# Patient Record
Sex: FEMALE | Race: WHITE | NOT HISPANIC OR LATINO | Employment: FULL TIME | URBAN - METROPOLITAN AREA
[De-identification: names, ages, dates, MRNs, and addresses within clinical notes are randomized per-mention and may not be internally consistent; named-entity substitution may affect disease eponyms.]

---

## 2017-01-13 DIAGNOSIS — Z82.79 FAMILY HISTORY OF CONGENITAL OR GENETIC CONDITION: Primary | ICD-10-CM

## 2017-01-23 ENCOUNTER — TELEPHONE (OUTPATIENT)
Dept: CONSULT | Facility: CLINIC | Age: 47
End: 2017-01-23

## 2017-01-23 NOTE — TELEPHONE ENCOUNTER
I attempted to reach Jesse's mother Pat regarding her missed lab appointment from last Friday.  She was scheduled to have blood drawn for the aCGH finding identified in her son Jesse, but was a no show for this.  I attempted to reach Pat to reschedule this appointment as well as to discuss Jesse's next appointment scheduled for Monday 1/30 which I believe will be more helpful for the family if pushed back until we have Pat's results and have a chance to discuss the plan for additional testing.  Pat was unavailable and a voicemail was left for her to return my call at her convenience.      Rachelle Renteria MS [facility] [place]  Genetic Counselor  Division of Genetics and Metabolism

## 2017-01-26 DIAGNOSIS — Z82.79 FAMILY HISTORY OF CONGENITAL OR GENETIC CONDITION: ICD-10-CM

## 2017-01-26 PROCEDURE — 40000891 ZZHCL STATISTIC CGH PARENTAL FOLLOW UP 81228: Performed by: PEDIATRICS

## 2017-01-26 PROCEDURE — 36415 COLL VENOUS BLD VENIPUNCTURE: CPT | Performed by: PEDIATRICS

## 2017-01-26 PROCEDURE — 40000803 ZZHCL STATISTIC DNA ISOL HIGH PURITY: Performed by: PEDIATRICS

## 2017-02-07 ENCOUNTER — OFFICE VISIT (OUTPATIENT)
Dept: FAMILY MEDICINE | Facility: CLINIC | Age: 47
End: 2017-02-07
Payer: COMMERCIAL

## 2017-02-07 VITALS
BODY MASS INDEX: 26.84 KG/M2 | DIASTOLIC BLOOD PRESSURE: 73 MMHG | SYSTOLIC BLOOD PRESSURE: 122 MMHG | TEMPERATURE: 97.5 F | HEART RATE: 86 BPM | OXYGEN SATURATION: 96 % | WEIGHT: 165 LBS

## 2017-02-07 DIAGNOSIS — J20.9 ACUTE BRONCHITIS WITH SYMPTOMS > 10 DAYS: ICD-10-CM

## 2017-02-07 DIAGNOSIS — J01.00 ACUTE NON-RECURRENT MAXILLARY SINUSITIS: Primary | ICD-10-CM

## 2017-02-07 PROCEDURE — 99213 OFFICE O/P EST LOW 20 MIN: CPT | Performed by: NURSE PRACTITIONER

## 2017-02-07 RX ORDER — PREDNISONE 20 MG/1
40 TABLET ORAL DAILY
Qty: 10 TABLET | Refills: 0 | Status: SHIPPED | OUTPATIENT
Start: 2017-02-07 | End: 2017-02-12

## 2017-02-07 RX ORDER — BENZONATATE 100 MG/1
100-200 CAPSULE ORAL 3 TIMES DAILY PRN
Qty: 42 CAPSULE | Refills: 0 | Status: SHIPPED | OUTPATIENT
Start: 2017-02-07 | End: 2017-04-24

## 2017-02-07 NOTE — PROGRESS NOTES
SUBJECTIVE:                                                    Pat Dan is a 46 year old female who presents to clinic today for the following health issues:      RESPIRATORY SYMPTOMS      Duration: Off and on sick since December     Description  nasal congestion, rhinorrhea, sore throat, facial pain/pressure, cough, wheezing, fever, chills, headache, fatigue/malaise, hoarse voice, conjunctival irritation and nausea    Severity: severe    Accompanying signs and symptoms: None    History (predisposing factors):  none    Precipitating or alleviating factors: IBU    Therapies tried and outcome:  OTC NSAID         Problem list and histories reviewed & adjusted, as indicated.  Additional history: as documented    Patient Active Problem List   Diagnosis     situational depression     CARDIOVASCULAR SCREENING; LDL GOAL LESS THAN 160     Shoulder pain     Past Surgical History   Procedure Laterality Date     Surgical history of -   age 16     laparoscopy     Surgical history of -   12/16/05     D & C, supracervical hysterectomy       Social History   Substance Use Topics     Smoking status: Never Smoker      Smokeless tobacco: Never Used     Alcohol Use: No     Family History   Problem Relation Age of Onset     Genitourinary Problems Maternal Grandmother      kidney disease     Genetic Disorder Other      Cystic fibrosis     Breast Cancer Paternal Aunt 55     Breast Cancer Cousin 42         Current Outpatient Prescriptions   Medication Sig Dispense Refill     amoxicillin-clavulanate (AUGMENTIN) 875-125 MG per tablet Take 1 tablet by mouth 2 times daily for 7 days 14 tablet 0     predniSONE (DELTASONE) 20 MG tablet Take 2 tablets (40 mg) by mouth daily for 5 days 10 tablet 0     benzonatate (TESSALON) 100 MG capsule Take 1-2 capsules (100-200 mg) by mouth 3 times daily as needed for cough (Keep out of reach of children) 42 capsule 0     No Known Allergies  BP Readings from Last 3 Encounters:   02/07/17 122/73    07/14/16 106/60   03/03/16 102/57    Wt Readings from Last 3 Encounters:   02/07/17 165 lb (74.844 kg)   07/14/16 160 lb (72.576 kg)   03/04/16 157 lb (71.215 kg)            Labs reviewed in EPIC  Problem list, Medication list, Allergies, and Medical/Social/Surgical histories reviewed in [facility] and updated as appropriate.    ROS:  Constitutional, HEENT, cardiovascular, pulmonary, GI, , musculoskeletal, neuro, skin, endocrine and psych systems are negative, except as otherwise noted.    OBJECTIVE:                                                    /73 mmHg  Pulse 86  Temp(Src) 97.5  F (36.4  C) (Oral)  Wt 165 lb (74.844 kg)  SpO2 96%  Body mass index is 26.84 kg/(m^2).  GENERAL: healthy, alert and no distress  EYES: Eyes grossly normal to inspection, PERRL and conjunctivae and sclerae normal  HENT: ear canals and TM's normal, and mouth without ulcers or lesions POSITIVE for clear serous fluid behind bilateral TMs, maxillary and frontal sinus pressure with palpation/ bending forward, nasal mucosa edematous, erythematous, PND present  RESP: POSITIVE for expiratory wheeze in bilateral lower lobes, improves with cough  CV: regular rate and rhythm, normal S1 S2, no S3 or S4, no murmur, click or rub, no peripheral edema and peripheral pulses strong  ABDOMEN: soft, nontender, no hepatosplenomegaly, no masses and bowel sounds normal  SKIN: no suspicious lesions or rashes  NEURO: Normal strength and tone, mentation intact and speech normal  LYMPH: no cervical, supraclavicular, axillary, or inguinal adenopathy    Diagnostic Test Results:  See orders   Did not receive influenza vaccine. Discussed influenza is going around and this could be a secondary infection associated with that.   ASSESSMENT/PLAN:                                                          ICD-10-CM    1. Acute non-recurrent maxillary sinusitis J01.00 amoxicillin-clavulanate (AUGMENTIN) 875-125 MG per tablet   2. Acute bronchitis with symptoms > 10  days J20.9 predniSONE (DELTASONE) 20 MG tablet     benzonatate (TESSALON) 100 MG capsule       See Patient Instructions    JOHANA Ojeda  [facility]

## 2017-02-07 NOTE — MR AVS SNAPSHOT
After Visit Summary   2/7/2017    Pat Dan    MRN: [id number]           Patient Information     Date Of Birth          1970        Visit Information        Provider Department      2/7/2017 9:20 AM Geeta Meehan NP [facility]        Today's Diagnoses     Acute non-recurrent maxillary sinusitis    -  1     Acute bronchitis with symptoms > 10 days           Care Instructions      Bronchitis with Wheezing (Viral or Bacterial: Adult)    Bronchitis is an infection of the air passages. It often occurs during the common cold and is usually caused by a virus. Symptoms include cough with mucus (phlegm) and low-grade fever. This illness is contagious during the first few days and is spread through the air by coughing and sneezing, or by direct contact (touching the sick person and then touching your own eyes, nose, or mouth).  If there is a lot of inflammation, air flow is restricted. The air passages may also go into spasm, especially if you have asthma. This causes wheezing and difficulty breathing even in people who do not have asthma.  Bronchitis usually lasts 7 to 14 days. The wheezing should improve with treatment during the first week. An inhaler is often prescribed to relax the air passages and stop wheezing. Antibiotics will be prescribed if your doctor thinks there is also a secondary bacterial infection.  Home care    If symptoms are severe, rest at home for the first 2 to 3 days. When you go back to your usual activities, don't let yourself get too tired.    Do not smoke. Also avoid being exposed to secondhand smoke.    You may use over-the-counter medicine to control fever or pain, unless another medicine was prescribed. Note: If you have chronic liver or kidney disease or have ever had a stomach ulcer or gastrointestinal bleeding, talk with your healthcare provider before using these medicines. Also talk to your provider if you are taking medicine to prevent blood  clots.) Aspirin should never be given to anyone younger than 18 years of age who is ill with a viral infection or fever. It may cause severe liver or brain damage.    Your appetite may be poor, so a light diet is fine. Avoid dehydration by drinking 6 to 8 glasses of fluids per day (such as water, soft drinks, sports drinks, juices, tea, or soup). Extra fluids will help loosen secretions in the nose and lungs.    Over-the-counter cough, cold, and sore-throat medicines will not shorten the length of the illness, but they may be helpful to reduce symptoms. (Note: Do not use decongestants if you have high blood pressure.)    If you were given an inhaler, use it exactly as directed. If you need to use it more often than prescribed, your condition may be worsening. If this happens, contact your healthcare provider.    If prescribed, finish all antibiotic medicine, even if you are feeling better after only a few days.  Follow-up care  Follow up with your healthcare provider, or as advised. If you had an X-ray or ECG (electrocardiogram), a specialist will review it. You will be notified of any new findings that may affect your care.  Note: If you are age 65 or older, or if you have a chronic lung disease or condition that affects your immune system, or you smoke, talk to your healthcare provider about having a pneumococcal vaccinations and a yearly influenza vaccination (flu shot).  When to seek medical advice   Call your healthcare provider right away if any of these occur:    Fever of 100.4 F (38 C) or higher    Coughing up increasing amounts of colored sputum    Weakness, drowsiness, headache, facial pain, ear pain, or a stiff neck  Call 911, or get immediate medical care  Contact emergency services right away if any of these occur.    Coughing up blood    Worsening weakness, drowsiness, headache, or stiff neck    Increased wheezing not helped with medication, shortness of breath, or pain with breathing    6006-4122 The  [employer]. [address] [place] [address], [place] [address]. All rights reserved. This information is not intended as a substitute for professional medical care. Always follow your healthcare professional's instructions.        Sinusitis (Antibiotic Treatment)    The sinuses are air-filled spaces within the bones of the face. They connect to the inside of the nose. Sinusitis is an inflammation of the tissue lining the sinus cavity. Sinus inflammation can occur during a cold. It can also be due to allergies to pollens and other particles in the air. Sinusitis can cause symptoms of sinus congestion and fullness. A sinus infection causes fever, headache and facial pain. There is often green or yellow drainage from the nose or into the back of the throat (post-nasal drip). You have been given antibiotics to treat this condition.  Home care:    Take the full course of antibiotics as instructed. Do not stop taking them, even if you feel better.    Drink plenty of water, hot tea, and other liquids. This may help thin mucus. It also may promote sinus drainage.    Heat may help soothe painful areas of the face. Use a towel soaked in hot water. Or,  the shower and direct the hot spray onto your face. Using a vaporizer along with a menthol rub at night may also help.     An expectorant containing guaifenesin may help thin the mucus and promote drainage from the sinuses.    Over-the-counter decongestants may be used unless a similar medicine was prescribed. Nasal sprays work the fastest. Use one that contains phenylephrine or oxymetazoline. First blow the nose gently. Then use the spray. Do not use these medicines more often than directed on the label or symptoms may get worse. You may also use tablets containing pseudoephedrine. Avoid products that combine ingredients, because side effects may be increased. Read labels. You can also ask the pharmacist for help. (NOTE: Persons with high blood pressure should not  use decongestants. They can raise blood pressure.)    Over-the-counter antihistamines may help if allergies contributed to your sinusitis.      Do not use nasal rinses or irrigation during an acute sinus infection, unless told to by your health care provider. Rinsing may spread the infection to other sinuses.    Use acetaminophen or ibuprofen to control pain, unless another pain medicine was prescribed. (If you have chronic liver or kidney disease or ever had a stomach ulcer, talk with your doctor before using these medicines. Aspirin should never be used in anyone under 18 years of age who is ill with a fever. It may cause severe liver damage.)    Don't smoke. This can worsen symptoms.  Follow-up care  Follow up with your healthcare provider or our staff if you are not improving within the next week.  When to seek medical advice  Call your healthcare provider if any of these occur:    Facial pain or headache becoming more severe    Stiff neck    Unusual drowsiness or confusion    Swelling of the forehead or eyelids    Vision problems, including blurred or double vision    Fever of 100.4 F (38 C) or higher, or as directed by your healthcare provider    Seizure    Breathing problems    Symptoms not resolving within 10 days    5322-9251 [employer]. [address] [place] [address]. All rights reserved. This information is not intended as a substitute for professional medical care. Always follow your healthcare professional's instructions.              Follow-ups after your visit        Your next 10 appointments already scheduled     Feb 09, 2017  4:30 PM   LAB with [facility] ([facility])    [address] [place] [id number]   [phone]           Patient must bring picture ID.  Patient should be prepared to give a urine specimen  Please do not eat 10-12 hours before your appointment if you are coming in  fasting for labs on lipids, cholesterol, or glucose (sugar).  Pregnant women should follow their Care Team instructions. Water with medications is okay. Do not drink coffee or other fluids.   If you have concerns about taking  your medications, please ask at office or if scheduling via [employer], send a message by clicking on Secure Messaging, Message Your Care Team.              Who to contact     Normal or non-critical lab and imaging results will be communicated to you by [employer], letter or phone within 4 business days after the clinic has received the results. If you do not hear from us within 7 days, please contact the clinic through [employer] or phone. If you have a critical or abnormal lab result, we will notify you by phone as soon as possible.  Submit refill requests through [employer] or call your pharmacy and they will forward the refill request to us. Please allow 3 business days for your refill to be completed.          If you need to speak with a  for additional information , please call: [phone]             Additional Information About Your Visit        [employer] Information     [employer] gives you secure access to your electronic health record. If you see a primary care provider, you can also send messages to your care team and make appointments. If you have questions, please call your primary care clinic.  If you do not have a primary care provider, please call [phone] and they will assist you.        Care EveryWhere ID     This is your Care EveryWhere ID. This could be used by other organizations to access your [facility] medical records  [id number]        Your Vitals Were     Pulse Temperature Pulse Oximetry             86 97.5  F (36.4  C) (Oral) 96%          Blood Pressure from Last 3 Encounters:   02/07/17 122/73   07/14/16 106/60   03/03/16 102/57    Weight from Last 3 Encounters:   02/07/17 165 lb (74.844 kg)   07/14/16 160 lb (72.576 kg)   03/04/16 157 lb (71.215 kg)               Today, you had the following     No orders found for display         Today's Medication Changes          These changes are accurate as of: 2/7/17  9:42 AM.  If you have any questions, ask your nurse or doctor.               Start taking these medicines.        Dose/Directions    amoxicillin-clavulanate 875-125 MG per tablet   Commonly known as:  AUGMENTIN   Used for:  Acute non-recurrent maxillary sinusitis   Started by:  Geeta Meehan NP        Dose:  1 tablet   Take 1 tablet by mouth 2 times daily for 7 days   Quantity:  14 tablet   Refills:  0       benzonatate 100 MG capsule   Commonly known as:  TESSALON   Used for:  Acute bronchitis with symptoms > 10 days   Started by:  Geeta Meehan NP        Dose:  100-200 mg   Take 1-2 capsules (100-200 mg) by mouth 3 times daily as needed for cough (Keep out of reach of children)   Quantity:  42 capsule   Refills:  0       predniSONE 20 MG tablet   Commonly known as:  DELTASONE   Used for:  Acute bronchitis with symptoms > 10 days   Started by:  Geeta Meehan NP        Dose:  40 mg   Take 2 tablets (40 mg) by mouth daily for 5 days   Quantity:  10 tablet   Refills:  0            Where to get your medicines      These medications were sent to [facility] [place]  [place] - [address] [address] [place] [address]     Phone:  [phone]    - amoxicillin-clavulanate 875-125 MG per tablet  - benzonatate 100 MG capsule  - predniSONE 20 MG tablet             Primary Care Provider Office Phone # Fax #    Raissa Kirby PA-C [phone]       [facility] [address]  [place] [address]        Thank you!     Thank you for choosing [facility]  for your care. Our goal is always to provide you with excellent care. Hearing back from our patients is one way we can continue to improve our services. Please take a few minutes to complete the written survey that you may receive in the mail after  your visit with us. Thank you!             Your Updated Medication List - Protect others around you: Learn how to safely use, store and throw away your medicines at www.disposemymeds.org.          This list is accurate as of: 2/7/17  9:42 AM.  Always use your most recent med list.                   Brand Name Dispense Instructions for use    amoxicillin-clavulanate 875-125 MG per tablet    AUGMENTIN    14 tablet    Take 1 tablet by mouth 2 times daily for 7 days       benzonatate 100 MG capsule    TESSALON    42 capsule    Take 1-2 capsules (100-200 mg) by mouth 3 times daily as needed for cough (Keep out of reach of children)       predniSONE 20 MG tablet    DELTASONE    10 tablet    Take 2 tablets (40 mg) by mouth daily for 5 days

## 2017-02-07 NOTE — PATIENT INSTRUCTIONS
Bronchitis with Wheezing (Viral or Bacterial: Adult)    Bronchitis is an infection of the air passages. It often occurs during the common cold and is usually caused by a virus. Symptoms include cough with mucus (phlegm) and low-grade fever. This illness is contagious during the first few days and is spread through the air by coughing and sneezing, or by direct contact (touching the sick person and then touching your own eyes, nose, or mouth).  If there is a lot of inflammation, air flow is restricted. The air passages may also go into spasm, especially if you have asthma. This causes wheezing and difficulty breathing even in people who do not have asthma.  Bronchitis usually lasts 7 to 14 days. The wheezing should improve with treatment during the first week. An inhaler is often prescribed to relax the air passages and stop wheezing. Antibiotics will be prescribed if your doctor thinks there is also a secondary bacterial infection.  Home care    If symptoms are severe, rest at home for the first 2 to 3 days. When you go back to your usual activities, don't let yourself get too tired.    Do not smoke. Also avoid being exposed to secondhand smoke.    You may use over-the-counter medicine to control fever or pain, unless another medicine was prescribed. Note: If you have chronic liver or kidney disease or have ever had a stomach ulcer or gastrointestinal bleeding, talk with your healthcare provider before using these medicines. Also talk to your provider if you are taking medicine to prevent blood clots.) Aspirin should never be given to anyone younger than 18 years of age who is ill with a viral infection or fever. It may cause severe liver or brain damage.    Your appetite may be poor, so a light diet is fine. Avoid dehydration by drinking 6 to 8 glasses of fluids per day (such as water, soft drinks, sports drinks, juices, tea, or soup). Extra fluids will help loosen secretions in the nose  and lungs.    Over-the-counter cough, cold, and sore-throat medicines will not shorten the length of the illness, but they may be helpful to reduce symptoms. (Note: Do not use decongestants if you have high blood pressure.)    If you were given an inhaler, use it exactly as directed. If you need to use it more often than prescribed, your condition may be worsening. If this happens, contact your healthcare provider.    If prescribed, finish all antibiotic medicine, even if you are feeling better after only a few days.  Follow-up care  Follow up with your healthcare provider, or as advised. If you had an X-ray or ECG (electrocardiogram), a specialist will review it. You will be notified of any new findings that may affect your care.  Note: If you are age 65 or older, or if you have a chronic lung disease or condition that affects your immune system, or you smoke, talk to your healthcare provider about having a pneumococcal vaccinations and a yearly influenza vaccination (flu shot).  When to seek medical advice   Call your healthcare provider right away if any of these occur:    Fever of 100.4 F (38 C) or higher    Coughing up increasing amounts of colored sputum    Weakness, drowsiness, headache, facial pain, ear pain, or a stiff neck  Call 911, or get immediate medical care  Contact emergency services right away if any of these occur.    Coughing up blood    Worsening weakness, drowsiness, headache, or stiff neck    Increased wheezing not helped with medication, shortness of breath, or pain with breathing    3849-8672 [employer]. [address] [place] [address]. All rights reserved. This information is not intended as a substitute for professional medical care. Always follow your healthcare professional's instructions.        Sinusitis (Antibiotic Treatment)    The sinuses are air-filled spaces within the bones of the face. They connect to the inside of the nose. Sinusitis is an inflammation  of the tissue lining the sinus cavity. Sinus inflammation can occur during a cold. It can also be due to allergies to pollens and other particles in the air. Sinusitis can cause symptoms of sinus congestion and fullness. A sinus infection causes fever, headache and facial pain. There is often green or yellow drainage from the nose or into the back of the throat (post-nasal drip). You have been given antibiotics to treat this condition.  Home care:    Take the full course of antibiotics as instructed. Do not stop taking them, even if you feel better.    Drink plenty of water, hot tea, and other liquids. This may help thin mucus. It also may promote sinus drainage.    Heat may help soothe painful areas of the face. Use a towel soaked in hot water. Or,  the shower and direct the hot spray onto your face. Using a vaporizer along with a menthol rub at night may also help.     An expectorant containing guaifenesin may help thin the mucus and promote drainage from the sinuses.    Over-the-counter decongestants may be used unless a similar medicine was prescribed. Nasal sprays work the fastest. Use one that contains phenylephrine or oxymetazoline. First blow the nose gently. Then use the spray. Do not use these medicines more often than directed on the label or symptoms may get worse. You may also use tablets containing pseudoephedrine. Avoid products that combine ingredients, because side effects may be increased. Read labels. You can also ask the pharmacist for help. (NOTE: Persons with high blood pressure should not use decongestants. They can raise blood pressure.)    Over-the-counter antihistamines may help if allergies contributed to your sinusitis.      Do not use nasal rinses or irrigation during an acute sinus infection, unless told to by your health care provider. Rinsing may spread the infection to other sinuses.    Use acetaminophen or ibuprofen to control pain, unless another pain medicine was  prescribed. (If you have chronic liver or kidney disease or ever had a stomach ulcer, talk with your doctor before using these medicines. Aspirin should never be used in anyone under 18 years of age who is ill with a fever. It may cause severe liver damage.)    Don't smoke. This can worsen symptoms.  Follow-up care  Follow up with your healthcare provider or our staff if you are not improving within the next week.  When to seek medical advice  Call your healthcare provider if any of these occur:    Facial pain or headache becoming more severe    Stiff neck    Unusual drowsiness or confusion    Swelling of the forehead or eyelids    Vision problems, including blurred or double vision    Fever of 100.4 F (38 C) or higher, or as directed by your healthcare provider    Seizure    Breathing problems    Symptoms not resolving within 10 days    4915-5846 [employer]. [address] [place] [address], [place] [address]. All rights reserved. This information is not intended as a substitute for professional medical care. Always follow your healthcare professional's instructions.

## 2017-02-07 NOTE — NURSING NOTE
"Chief Complaint   Patient presents with     Cough       Initial /73 mmHg  Pulse 86  Temp(Src) 97.5  F (36.4  C) (Oral)  Wt 165 lb (74.844 kg)  SpO2 96% Estimated body mass index is 26.84 kg/(m^2) as calculated from the following:    Height as of 7/14/16: 5' 5.75\" (1.67 m).    Weight as of this encounter: 165 lb (74.844 kg).  Medication Reconciliation: complete     Elizabeth Combs CMA      "

## 2017-02-08 LAB — COPATH REPORT: NORMAL

## 2017-02-09 DIAGNOSIS — Z82.79 FAMILY HISTORY OF CONGENITAL OR GENETIC CONDITION: Primary | ICD-10-CM

## 2017-02-09 DIAGNOSIS — Z82.79 FAMILY HISTORY OF CONGENITAL OR GENETIC CONDITION: ICD-10-CM

## 2017-02-09 LAB — MISCELLANEOUS TEST: NORMAL

## 2017-02-09 PROCEDURE — 36415 COLL VENOUS BLD VENIPUNCTURE: CPT | Performed by: PEDIATRICS

## 2017-04-13 LAB
LOCATION PERFORMED: NORMAL
RESULT: NORMAL
SEND OUTS MISC TEST CODE: 561
SEND OUTS MISC TEST SPECIMEN: NORMAL
TEST NAME: NORMAL

## 2017-04-24 ENCOUNTER — OFFICE VISIT (OUTPATIENT)
Dept: FAMILY MEDICINE | Facility: CLINIC | Age: 47
End: 2017-04-24
Payer: COMMERCIAL

## 2017-04-24 VITALS
HEART RATE: 83 BPM | DIASTOLIC BLOOD PRESSURE: 70 MMHG | WEIGHT: 160.8 LBS | TEMPERATURE: 98.4 F | BODY MASS INDEX: 26.15 KG/M2 | SYSTOLIC BLOOD PRESSURE: 104 MMHG | OXYGEN SATURATION: 98 %

## 2017-04-24 DIAGNOSIS — R68.82 DECREASED LIBIDO: ICD-10-CM

## 2017-04-24 DIAGNOSIS — R53.83 FATIGUE, UNSPECIFIED TYPE: ICD-10-CM

## 2017-04-24 DIAGNOSIS — Z86.39 HISTORY OF IRON DEFICIENCY: Primary | ICD-10-CM

## 2017-04-24 LAB
BASOPHILS # BLD AUTO: 0.1 10E9/L (ref 0–0.2)
BASOPHILS NFR BLD AUTO: 0.6 %
DIFFERENTIAL METHOD BLD: ABNORMAL
EOSINOPHIL # BLD AUTO: 0 10E9/L (ref 0–0.7)
EOSINOPHIL NFR BLD AUTO: 0.3 %
ERYTHROCYTE [DISTWIDTH] IN BLOOD BY AUTOMATED COUNT: 12.8 % (ref 10–15)
HCT VFR BLD AUTO: 36 % (ref 35–47)
HGB BLD-MCNC: 11.8 G/DL (ref 11.7–15.7)
LYMPHOCYTES # BLD AUTO: 1.8 10E9/L (ref 0.8–5.3)
LYMPHOCYTES NFR BLD AUTO: 15.1 %
MCH RBC QN AUTO: 30.2 PG (ref 26.5–33)
MCHC RBC AUTO-ENTMCNC: 32.8 G/DL (ref 31.5–36.5)
MCV RBC AUTO: 92 FL (ref 78–100)
MONOCYTES # BLD AUTO: 0.7 10E9/L (ref 0–1.3)
MONOCYTES NFR BLD AUTO: 5.6 %
NEUTROPHILS # BLD AUTO: 9.2 10E9/L (ref 1.6–8.3)
NEUTROPHILS NFR BLD AUTO: 78.4 %
PLATELET # BLD AUTO: 244 10E9/L (ref 150–450)
RBC # BLD AUTO: 3.91 10E12/L (ref 3.8–5.2)
WBC # BLD AUTO: 11.7 10E9/L (ref 4–11)

## 2017-04-24 PROCEDURE — 83550 IRON BINDING TEST: CPT | Performed by: PHYSICIAN ASSISTANT

## 2017-04-24 PROCEDURE — 99214 OFFICE O/P EST MOD 30 MIN: CPT | Performed by: PHYSICIAN ASSISTANT

## 2017-04-24 PROCEDURE — 82607 VITAMIN B-12: CPT | Performed by: PHYSICIAN ASSISTANT

## 2017-04-24 PROCEDURE — 83540 ASSAY OF IRON: CPT | Performed by: PHYSICIAN ASSISTANT

## 2017-04-24 PROCEDURE — 36415 COLL VENOUS BLD VENIPUNCTURE: CPT | Performed by: PHYSICIAN ASSISTANT

## 2017-04-24 PROCEDURE — 80050 GENERAL HEALTH PANEL: CPT | Performed by: PHYSICIAN ASSISTANT

## 2017-04-24 PROCEDURE — 82728 ASSAY OF FERRITIN: CPT | Performed by: PHYSICIAN ASSISTANT

## 2017-04-24 NOTE — MR AVS SNAPSHOT
After Visit Summary   4/24/2017    Pat Dan    MRN: [id number]           Patient Information     Date Of Birth          1970        Visit Information        Provider Department      4/24/2017 5:40 PM Raissa Kirby PA-C [facility]        Today's Diagnoses     History of iron deficiency    -  1    Fatigue, unspecified type        Decreased libido          Care Instructions    Start the following:  Vitamin D 2000 units per day  A B complex vitamin daily        Follow-ups after your visit        Who to contact     Normal or non-critical lab and imaging results will be communicated to you by [facility], letter or phone within 4 business days after the clinic has received the results. If you do not hear from us within 7 days, please contact the clinic through [employer] or phone. If you have a critical or abnormal lab result, we will notify you by phone as soon as possible.  Submit refill requests through [employer] or call your pharmacy and they will forward the refill request to us. Please allow 3 business days for your refill to be completed.          If you need to speak with a  for additional information , please call: [phone]             Additional Information About Your Visit        MyChart Information     [employer] gives you secure access to your electronic health record. If you see a primary care provider, you can also send messages to your care team and make appointments. If you have questions, please call your primary care clinic.  If you do not have a primary care provider, please call [phone] and they will assist you.        Care EveryWhere ID     This is your Care EveryWhere ID. This could be used by other organizations to access your [place] medical records  [id number]        Your Vitals Were     Pulse Temperature Pulse Oximetry BMI (Body Mass Index)          83 98.4  F (36.9  C) (Oral) 98% 26.15 kg/m2         Blood Pressure from Last 3  Encounters:   04/24/17 104/70   02/07/17 122/73   07/14/16 106/60    Weight from Last 3 Encounters:   04/24/17 160 lb 12.8 oz (72.9 kg)   02/07/17 165 lb (74.8 kg)   07/14/16 160 lb (72.6 kg)              We Performed the Following     CBC with platelets differential     Comprehensive metabolic panel     Ferritin     Iron and iron binding capacity     TSH with free T4 reflex     Vitamin B12        Primary Care Provider Office Phone # Fax #    Raissa Kirby PA-C [phone]       [facility] [address] [facility] [address]        Thank you!     Thank you for choosing [facility]  for your care. Our goal is always to provide you with excellent care. Hearing back from our patients is one way we can continue to improve our services. Please take a few minutes to complete the written survey that you may receive in the mail after your visit with us. Thank you!             Your Updated Medication List - Protect others around you: Learn how to safely use, store and throw away your medicines at www.disposemymeds.org.      Notice  As of 4/24/2017  6:06 PM    You have not been prescribed any medications.

## 2017-04-24 NOTE — PROGRESS NOTES
SUBJECTIVE:                                                    Pat Dan is a 46 year old female who presents to clinic today for the following health issues:    Low libido   Was single mom, not so much interested in sex  Now engaged, still not so much interested     Nutrition   Runner, excessively, about 45 miles weekly   Met with meal planner modified diet due to running  Dropped 2.5% body fat in the last x1 month     Some mucousy red discharge in stools   Happens after her 2 long runs of the week, 15+ miles    Low iron   Bruising easily  Low iron when giving plasma   Fatigue   Currently running 5 days per week and weight training 5 days per seek, 1 rest day  Long runs can be 20 miles long  5 kids at home, one special needs       Had this in the past with pregnancy         Problem list and histories reviewed & adjusted, as indicated.  Additional history: as documented    Patient Active Problem List   Diagnosis     Shoulder pain     Past Surgical History:   Procedure Laterality Date     SURGICAL HISTORY OF -   age 16    laparoscopy     SURGICAL HISTORY OF -   12/16/05    D & C, supracervical hysterectomy       Social History   Substance Use Topics     Smoking status: Never Smoker     Smokeless tobacco: Never Used     Alcohol use No     Family History   Problem Relation Age of Onset     Genitourinary Problems Maternal Grandmother      kidney disease     Genetic Disorder Other      Cystic fibrosis     Breast Cancer Paternal Aunt 55     Breast Cancer Cousin 42           Reviewed and updated as needed this visit by clinical staff  Tobacco  Allergies  Meds       Reviewed and updated as needed this visit by Provider         ROS:  Constitutional, endocrine, and psychiatric systems are negative, except as otherwise noted.    OBJECTIVE:                                                    /70  Pulse 83  Temp 98.4  F (36.9  C) (Oral)  Wt 160 lb 12.8 oz (72.9 kg)  SpO2 98%  BMI 26.15 kg/m2  Body  mass index is 26.15 kg/(m^2).  Constitutional: healthy, alert, active, no distress.    Musculoskeletal: extremities normal- no gross deformities noted, gait normal, normal muscle tone and able to move about the exam room without difficulty.    Skin: no suspicious lesions or rashes appreciated on exposed areas  Neurologic: Gait normal. Moving all extremities spontaneously, no apparent weakness.    Psychiatric: mentation appears normal, thoughts are clear and concise. Converses appropriately. Affect is Appropriate/mood-congruent       ASSESSMENT:                                                      1. History of iron deficiency    2. Fatigue, unspecified type    3. Decreased libido         PLAN:                                                    I believe her fatigue comes from many things: training for a marathon, emotional stressors with kids and family, and working part time. Will obtain some baseline labs today to rule out common organic causes of fatigue. Mood seems good, she denies depression.    Patient Instructions   Start the following:  Vitamin D 2000 units per day  A B complex vitamin daily       The patient was in agreement with the plan today and had no questions or concerns prior to leaving the clinic.     Raissa Kirby PA-C  [facility]

## 2017-04-25 LAB
ALBUMIN SERPL-MCNC: 3.3 G/DL (ref 3.4–5)
ALP SERPL-CCNC: 59 U/L (ref 40–150)
ALT SERPL W P-5'-P-CCNC: 27 U/L (ref 0–50)
ANION GAP SERPL CALCULATED.3IONS-SCNC: 9 MMOL/L (ref 3–14)
AST SERPL W P-5'-P-CCNC: 19 U/L (ref 0–45)
BILIRUB SERPL-MCNC: 0.5 MG/DL (ref 0.2–1.3)
BUN SERPL-MCNC: 32 MG/DL (ref 7–30)
CALCIUM SERPL-MCNC: 8.9 MG/DL (ref 8.5–10.1)
CHLORIDE SERPL-SCNC: 113 MMOL/L (ref 94–109)
CO2 SERPL-SCNC: 22 MMOL/L (ref 20–32)
CREAT SERPL-MCNC: 1.03 MG/DL (ref 0.52–1.04)
FERRITIN SERPL-MCNC: 10 NG/ML (ref 8–252)
GFR SERPL CREATININE-BSD FRML MDRD: 57 ML/MIN/1.7M2
GLUCOSE SERPL-MCNC: 109 MG/DL (ref 70–99)
IRON SATN MFR SERPL: 6 % (ref 15–46)
IRON SERPL-MCNC: 21 UG/DL (ref 35–180)
POTASSIUM SERPL-SCNC: 4.1 MMOL/L (ref 3.4–5.3)
PROT SERPL-MCNC: 5.9 G/DL (ref 6.8–8.8)
SODIUM SERPL-SCNC: 144 MMOL/L (ref 133–144)
TIBC SERPL-MCNC: 328 UG/DL (ref 240–430)
TSH SERPL DL<=0.005 MIU/L-ACNC: 1.37 MU/L (ref 0.4–4)
VIT B12 SERPL-MCNC: 572 PG/ML (ref 193–986)

## 2017-04-26 PROBLEM — D72.829 LEUKOCYTOSIS, UNSPECIFIED TYPE: Status: ACTIVE | Noted: 2017-04-26

## 2017-05-03 ENCOUNTER — TELEPHONE (OUTPATIENT)
Dept: FAMILY MEDICINE | Facility: CLINIC | Age: 47
End: 2017-05-03

## 2017-05-03 NOTE — TELEPHONE ENCOUNTER
Patient notified and voiced understanding and agreement.  She will also review on My chart.  Melissa Fong RN

## 2017-05-03 NOTE — TELEPHONE ENCOUNTER
Please call patient with the following info:  [employer] message not read after 1 week.    There are a few abnormalities with your labs:   1) Your white blood cell count is mildly elevated. This could be due to inflammation. I'd like to recheck this in 1 month.   2) Your kidney function has decreased a bit - are you taking much ibuprofen or Aleve?   3) Your protein and albumin levels are a bit low - this could be due to dehydration. I know you're working out a lot. Make sure you're getting at least 90 ounces of water per day.   4) Your iron levels are low and your iron stores are low-normal, but your hemoglobin is normal. You're iron deficient, but not anemic. I recommend taking an iron supplement 1-2 times daily for the next 3 months and then rechecking your labs.

## 2017-06-10 ENCOUNTER — HEALTH MAINTENANCE LETTER (OUTPATIENT)
Age: 47
End: 2017-06-10

## 2017-09-12 ENCOUNTER — OFFICE VISIT (OUTPATIENT)
Dept: FAMILY MEDICINE | Facility: CLINIC | Age: 47
End: 2017-09-12
Payer: COMMERCIAL

## 2017-09-12 VITALS
WEIGHT: 166 LBS | DIASTOLIC BLOOD PRESSURE: 73 MMHG | HEART RATE: 69 BPM | TEMPERATURE: 97.8 F | OXYGEN SATURATION: 97 % | BODY MASS INDEX: 26.68 KG/M2 | SYSTOLIC BLOOD PRESSURE: 109 MMHG | HEIGHT: 66 IN

## 2017-09-12 DIAGNOSIS — A63.0 CONDYLOMATA ACUMINATA IN FEMALE: ICD-10-CM

## 2017-09-12 DIAGNOSIS — B96.20 E. COLI UTI (URINARY TRACT INFECTION): ICD-10-CM

## 2017-09-12 DIAGNOSIS — N39.0 E. COLI UTI (URINARY TRACT INFECTION): ICD-10-CM

## 2017-09-12 DIAGNOSIS — R30.0 DYSURIA: Primary | ICD-10-CM

## 2017-09-12 PROBLEM — E61.1 IRON DEFICIENCY: Status: RESOLVED | Noted: 2017-04-26 | Resolved: 2017-09-12

## 2017-09-12 PROBLEM — D72.829 LEUKOCYTOSIS, UNSPECIFIED TYPE: Status: RESOLVED | Noted: 2017-04-26 | Resolved: 2017-09-12

## 2017-09-12 LAB
BACTERIA #/AREA URNS HPF: ABNORMAL /HPF
ERYTHROCYTE [DISTWIDTH] IN BLOOD BY AUTOMATED COUNT: 12.5 % (ref 10–15)
HCT VFR BLD AUTO: 37.5 % (ref 35–47)
HGB BLD-MCNC: 12.4 G/DL (ref 11.7–15.7)
MCH RBC QN AUTO: 29.8 PG (ref 26.5–33)
MCHC RBC AUTO-ENTMCNC: 33.1 G/DL (ref 31.5–36.5)
MCV RBC AUTO: 90 FL (ref 78–100)
NON-SQ EPI CELLS #/AREA URNS LPF: ABNORMAL /LPF
PLATELET # BLD AUTO: 231 10E9/L (ref 150–450)
RBC # BLD AUTO: 4.16 10E12/L (ref 3.8–5.2)
RBC #/AREA URNS AUTO: ABNORMAL /HPF
WBC # BLD AUTO: 5.3 10E9/L (ref 4–11)
WBC #/AREA URNS AUTO: ABNORMAL /HPF

## 2017-09-12 PROCEDURE — 85027 COMPLETE CBC AUTOMATED: CPT | Performed by: FAMILY MEDICINE

## 2017-09-12 PROCEDURE — 81015 MICROSCOPIC EXAM OF URINE: CPT | Performed by: FAMILY MEDICINE

## 2017-09-12 PROCEDURE — 99214 OFFICE O/P EST MOD 30 MIN: CPT | Performed by: FAMILY MEDICINE

## 2017-09-12 PROCEDURE — 36415 COLL VENOUS BLD VENIPUNCTURE: CPT | Performed by: FAMILY MEDICINE

## 2017-09-12 PROCEDURE — 87086 URINE CULTURE/COLONY COUNT: CPT | Performed by: FAMILY MEDICINE

## 2017-09-12 RX ORDER — NITROFURANTOIN 25; 75 MG/1; MG/1
100 CAPSULE ORAL 2 TIMES DAILY
Qty: 14 CAPSULE | Refills: 0 | Status: SHIPPED | OUTPATIENT
Start: 2017-09-12 | End: 2017-09-19

## 2017-09-12 RX ORDER — PODOFILOX 5 MG/ML
SOLUTION TOPICAL 2 TIMES DAILY
Qty: 3.2 ML | Refills: 1 | Status: SHIPPED | OUTPATIENT
Start: 2017-09-12 | End: 2020-08-13

## 2017-09-12 NOTE — PATIENT INSTRUCTIONS
Genital Warts (Condyloma)     Genital warts (also called condyloma) are caused by a virus that is often transmitted sexually. This virus is known as human papillomavirus  (HPV). The warts are often so tiny that they are hard to see. But even tiny warts can cause big trouble, especially in women. Genital warts can cause cell changes that can lead to genital cancers such as cervical cancer. Warts can even be passed to babies during childbirth.  Note: Latex condoms may help protect against genital warts. But condoms don t cover all the areas that can get infected. That means condoms may not protect you completely.   What are the symptoms of genital warts?  Genital warts can be flat. Or they can be raised and look like tiny cauliflowers. They can grow on the penis, vagina, or cervix. They can also grow in and around the rectum, and even in the throat. You can have the virus for many months before the warts appear. Or you may have the virus but never develop visible warts. Once warts form, they are often too small to be noticed. That s why you need regular exams by your healthcare provider. He or she can find tiny warts and can check your cells for changes that mean the virus is present.  What is the treatment of genital warts?  Genital warts tend to grow with time. The smaller the warts are, the easier they are to remove. So don t delay. Warts are most often removed with chemicals. Sometimes they re frozen off with liquid nitrogen. Warts may also be removed with heat or laser. More than one treatment may be needed. Never try to treat genital warts yourself.  How are genital warts prevented?  To prevent genital warts, consider getting vaccinated. Your healthcare provider can tell you if the vaccine is right for you. Also know your partner s sexual history. Even if someone doesn t have visible warts, he or she can still transmit the virus. Protect yourself by using latex condoms. And get regular medical exams. In women,  regular Pap smears can help detect changes caused by genital warts and catch any signs of cervical cancer early. Also, ask your healthcare provider about the HPV vaccine.  Resources  [employer] STD Hotline   [phone]   www.ashastd.org  Centers for Disease Control and Prevention   189.701.9664  www.cdc.gov/std   Date Last Reviewed: 1/1/2017 2000-2017 [employer]. [address] [place] [address], [place] [id number]. All rights reserved. This information is not intended as a substitute for professional medical care. Always follow your healthcare professional's instructions.

## 2017-09-12 NOTE — MR AVS SNAPSHOT
After Visit Summary   9/12/2017    Pat Dan    MRN: [id number]           Patient Information     Date Of Birth          1970        Visit Information        Provider Department      9/12/2017 10:00 AM Rosetta Schwartz MD [facility]        Today's Diagnoses     Dysuria    -  1    E. coli UTI (urinary tract infection)        Condylomata acuminata in female          Care Instructions      Genital Warts (Condyloma)     Genital warts (also called condyloma) are caused by a virus that is often transmitted sexually. This virus is known as human papillomavirus  (HPV). The warts are often so tiny that they are hard to see. But even tiny warts can cause big trouble, especially in women. Genital warts can cause cell changes that can lead to genital cancers such as cervical cancer. Warts can even be passed to babies during childbirth.  Note: Latex condoms may help protect against genital warts. But condoms don t cover all the areas that can get infected. That means condoms may not protect you completely.   What are the symptoms of genital warts?  Genital warts can be flat. Or they can be raised and look like tiny cauliflowers. They can grow on the penis, vagina, or cervix. They can also grow in and around the rectum, and even in the throat. You can have the virus for many months before the warts appear. Or you may have the virus but never develop visible warts. Once warts form, they are often too small to be noticed. That s why you need regular exams by your healthcare provider. He or she can find tiny warts and can check your cells for changes that mean the virus is present.  What is the treatment of genital warts?  Genital warts tend to grow with time. The smaller the warts are, the easier they are to remove. So don t delay. Warts are most often removed with chemicals. Sometimes they re frozen off with liquid nitrogen. Warts may also be removed with heat or laser. More than one  treatment may be needed. Never try to treat genital warts yourself.  How are genital warts prevented?  To prevent genital warts, consider getting vaccinated. Your healthcare provider can tell you if the vaccine is right for you. Also know your partner s sexual history. Even if someone doesn t have visible warts, he or she can still transmit the virus. Protect yourself by using latex condoms. And get regular medical exams. In women, regular Pap smears can help detect changes caused by genital warts and catch any signs of cervical cancer early. Also, ask your healthcare provider about the HPV vaccine.  Resources  [employer] STD Hotline   736.656.6387   www.ashastd.org  Centers for Disease Control and Prevention   788.208.2498  www.cdc.gov/std   Date Last Reviewed: 1/1/2017 2000-2017 [employer]. [address] [place] [address]. All rights reserved. This information is not intended as a substitute for professional medical care. Always follow your healthcare professional's instructions.                Follow-ups after your visit        Follow-up notes from your care team     Return if symptoms worsen or fail to improve.      Who to contact     Normal or non-critical lab and imaging results will be communicated to you by Restarohart, letter or phone within 4 business days after the clinic has received the results. If you do not hear from us within 7 days, please contact the clinic through Restarohart or phone. If you have a critical or abnormal lab result, we will notify you by phone as soon as possible.  Submit refill requests through App in the Air or call your pharmacy and they will forward the refill request to us. Please allow 3 business days for your refill to be completed.          If you need to speak with a  for additional information , please call: [phone]             Additional Information About Your Visit        [employer] Information     [employer] gives  "you secure access to your electronic health record. If you see a primary care provider, you can also send messages to your care team and make appointments. If you have questions, please call your primary care clinic.  If you do not have a primary care provider, please call [phone] and they will assist you.        Care EveryWhere ID     This is your Care EveryWhere ID. This could be used by other organizations to access your [facility] medical records  [id number]        Your Vitals Were     Pulse Temperature Height Pulse Oximetry Breastfeeding? BMI (Body Mass Index)    69 97.8  F (36.6  C) (Tympanic) 5' 5.75\" (1.67 m) 97% No 27 kg/m2       Blood Pressure from Last 3 Encounters:   09/12/17 109/73   04/24/17 104/70   02/07/17 122/73    Weight from Last 3 Encounters:   09/12/17 166 lb (75.3 kg)   04/24/17 160 lb 12.8 oz (72.9 kg)   02/07/17 165 lb (74.8 kg)              We Performed the Following     CBC with platelets     Urine Culture Aerobic Bacterial     Urine Microscopic          Today's Medication Changes          These changes are accurate as of: 9/12/17 11:13 AM.  If you have any questions, ask your nurse or doctor.               Start taking these medicines.        Dose/Directions    nitroFURantoin (macrocrystal-monohydrate) 100 MG capsule   Commonly known as:  MACROBID   Used for:  E. coli UTI (urinary tract infection)   Started by:  Rosetta Schwartz MD        Dose:  100 mg   Take 1 capsule (100 mg) by mouth 2 times daily for 7 days   Quantity:  14 capsule   Refills:  0       podofilox 0.5 % external solution   Commonly known as:  CONDYLOX   Used for:  Condylomata acuminata in female   Started by:  Rosetta Schwartz MD        Apply topically 2 times daily X 3 days, off treatment x 4 days. Cycle may be repeated up to 4 cycles.   Quantity:  3.2 mL   Refills:  1            Where to get your medicines      These medications were sent to [facility] - [address] [address] " [facility] [place] [address]     Phone:  [phone]     nitroFURantoin (macrocrystal-monohydrate) 100 MG capsule    podofilox 0.5 % external solution                Primary Care Provider Office Phone # Fax #    Raissa Kirby PA-C [phone]       [address]  [place] [address]        Equal Access to Services     [facility] JOSIAH : Hadii aad ku hadasho Soomaali, waaxda luqadaha, qaybta kaalmada adeegyada, waxay idiin hayaan adeeg kharash la'aan . So [facility] [phone].    ATENCIÓN: Si habla español, tiene a pierre disposición servicios gratuitos de asistencia lingüística. Llame al [phone].    We comply with applicable federal civil rights laws and [place] laws. We do not discriminate on the basis of race, color, national origin, age, disability sex, sexual orientation or gender identity.            Thank you!     Thank you for choosing [facility]  for your care. Our goal is always to provide you with excellent care. Hearing back from our patients is one way we can continue to improve our services. Please take a few minutes to complete the written survey that you may receive in the mail after your visit with us. Thank you!             Your Updated Medication List - Protect others around you: Learn how to safely use, store and throw away your medicines at www.disposemymeds.org.          This list is accurate as of: 9/12/17 11:13 AM.  Always use your most recent med list.                   Brand Name Dispense Instructions for use Diagnosis    nitroFURantoin (macrocrystal-monohydrate) 100 MG capsule    MACROBID    14 capsule    Take 1 capsule (100 mg) by mouth 2 times daily for 7 days    E. coli UTI (urinary tract infection)       podofilox 0.5 % external solution    CONDYLOX    3.2 mL    Apply topically 2 times daily X 3 days, off treatment x 4 days. Cycle may be repeated up to 4 cycles.    Condylomata acuminata in female

## 2017-09-12 NOTE — NURSING NOTE
"Chief Complaint   Patient presents with     UTI       Initial /73  Pulse 69  Temp 97.8  F (36.6  C) (Tympanic)  Ht 5' 5.75\" (1.67 m)  Wt 166 lb (75.3 kg)  SpO2 97%  Breastfeeding? No  BMI 27 kg/m2 Estimated body mass index is 27 kg/(m^2) as calculated from the following:    Height as of this encounter: 5' 5.75\" (1.67 m).    Weight as of this encounter: 166 lb (75.3 kg).  Medication Reconciliation: complete     Crystal Bee MA      "

## 2017-09-12 NOTE — PROGRESS NOTES
SUBJECTIVE:   Pat Dan is a 47 year old female who presents to clinic today for the following health issues:      URINARY TRACT SYMPTOMS  Onset: ongoing- was seen in Urgent Care at [facility] in [place] 9/2/17 for UTI     Description:   Painful urination (Dysuria): YES  Blood in urine (Hematuria): no   Delay in urine (Hesitency): no but having frequency     Intensity: severe    Progression of Symptoms:  worsening    Accompanying Signs & Symptoms:  Fever/chills: YES before taking AZO  Flank pain no   Nausea and vomiting: YES- nausea   Any vaginal symptoms: odor in urine, also reporting some bumps in vaginal region.   Abdominal/Pelvic Pain: YES- abdominal     History:   History of frequent UTI's: no   History of kidney stones: no   Sexually Active: no   Possibility of pregnancy: No    Precipitating factors:   none    Therapies Tried and outcome: Given cefuroxime 500 mg BID x 5 days was prescribed 9/2 and completed- reports she never felt it was 100% gone.  AZO was taken this morning       Care everywhere was obtained and reviewed.     Also has flesh bumps on her genital area that she recently noticed and would like to have them checked out.   Denies any recent change in sexual partners.       Problem list and histories reviewed & adjusted, as indicated.  Additional history: as documented    Patient Active Problem List   Diagnosis     Shoulder pain     Condylomata acuminata in female     Past Surgical History:   Procedure Laterality Date     SURGICAL HISTORY OF -   age 16    laparoscopy     SURGICAL HISTORY OF -   12/16/05    D & C, supracervical hysterectomy       Social History   Substance Use Topics     Smoking status: Never Smoker     Smokeless tobacco: Never Used     Alcohol use No     Family History   Problem Relation Age of Onset     Genitourinary Problems Maternal Grandmother      kidney disease     Genetic Disorder Other      Cystic fibrosis     Breast Cancer Paternal Aunt 55     Breast Cancer  "Cousin 42         Current Outpatient Prescriptions   Medication Sig Dispense Refill     podofilox (CONDYLOX) 0.5 % external solution Apply topically 2 times daily X 3 days, off treatment x 4 days. Cycle may be repeated up to 4 cycles. 3.2 mL 1     nitroFURantoin, macrocrystal-monohydrate, (MACROBID) 100 MG capsule Take 1 capsule (100 mg) by mouth 2 times daily for 7 days 14 capsule 0     No Known Allergies      [facility] and [facility]  [facility]       Reviewed and updated as needed this visit by Provider         ROS:  Constitutional, HEENT, cardiovascular, pulmonary, gi and gu systems are negative, except as otherwise noted.      OBJECTIVE:   /73  Pulse 69  Temp 97.8  F (36.6  C) (Tympanic)  Ht 5' 5.75\" (1.67 m)  Wt 166 lb (75.3 kg)  SpO2 97%  Breastfeeding? No  BMI 27 kg/m2  Body mass index is 27 kg/(m^2).  GENERAL: healthy, alert and no distress  ABDOMEN: soft, no CVA tenderness, mild suprapubic tenderness, no hepatosplenomegaly, no masses and bowel sounds normal   (female):l female external genitalia revealed multiple subcentimeter smooth papules limited to the genital area.    Diagnostic Test Results:  Reviewed and discussed with patient prior to discharge.  Results for orders placed or performed in visit on 09/12/17   Urine Microscopic   Result Value Ref Range    WBC Urine 5-10 (A) OTO2^O - 2 /HPF    RBC Urine O - 2 OTO2^O - 2 /HPF    Squamous Epithelial /LPF Urine Few FEW^Few /LPF    Bacteria Urine Few (A) NEG^Negative /HPF   CBC with platelets   Result Value Ref Range    WBC 5.3 4.0 - 11.0 10e9/L    RBC Count 4.16 3.8 - 5.2 10e12/L    Hemoglobin 12.4 11.7 - 15.7 g/dL    Hematocrit 37.5 35.0 - 47.0 %    MCV 90 78 - 100 fl    MCH 29.8 26.5 - 33.0 pg    MCHC 33.1 31.5 - 36.5 g/dL    RDW 12.5 10.0 - 15.0 %    Platelet Count 231 150 - 450 10e9/L         ASSESSMENT/PLAN:     Pat was seen today for uti.    Diagnoses and all orders for this " visit:    Dysuria  -    UA reflex to Microscopic and Culture   -     Urine Microscopic  -     Urine Culture Aerobic Bacterial    Recent E. coli UTI (urinary tract infection) with persistent symptoms  -     CBC with platelets  -     Start: nitroFURantoin, macrocrystal-monohydrate, (MACROBID) 100 MG capsule; Take 1 capsule (100 mg) by mouth 2 times daily for 7 days    Condylomata acuminata in female, newly diagnosed  -     podofilox (CONDYLOX) 0.5 % external solution; Apply topically 2 times daily X 3 days, off treatment x 4 days. Cycle may be repeated up to 4 cycles.    Patient was upset about the diagnosis of genital warts. Viewed images of genital warts with patient in the room on Visualdx  Patient education and Handout given.     Follow up if symptoms fail to improve or worsen.      The patient was in agreement with the plan today and had no questions or concerns prior to leaving the clinic.          Rosetta Schwartz MD  [facility]

## 2017-09-13 LAB
BACTERIA SPEC CULT: NO GROWTH
SPECIMEN SOURCE: NORMAL

## 2017-09-30 ENCOUNTER — HEALTH MAINTENANCE LETTER (OUTPATIENT)
Age: 47
End: 2017-09-30

## 2017-10-20 ENCOUNTER — TELEPHONE (OUTPATIENT)
Dept: FAMILY MEDICINE | Facility: CLINIC | Age: 47
End: 2017-10-20

## 2017-10-20 NOTE — TELEPHONE ENCOUNTER
It's probably best she be seen.   Per records: she was seen at urgent care and given abx. Sxs never went away so she saw Dr. Schwartz and given abx. Urine culture from that day was negative.

## 2018-03-27 NOTE — NURSING NOTE
After Visit Summary   3/27/2018    Maricarmen Ornelas    MRN: [id number]           Patient Information     Date Of Birth          1981        Visit Information        Provider Department      3/27/2018 9:30 AM Venkatesh Burton MD [facility]        Today's Diagnoses     Hypothyroid in pregnancy, antepartum, second trimester    -  1    Dichorionic diamniotic twin pregnancy in second trimester        Cramping complicating pregnancy, antepartum           Follow-ups after your visit        Follow-up notes from your care team     Return if symptoms worsen or fail to improve.      Your next 10 appointments already scheduled     Apr 04, 2018 10:30 AM CDT   ESTABLISHED PRENATAL with Analy Bang MD   [facility] ([facility])    [address]  [place] [address]   [phone]            Apr 19, 2018 12:45 PM CDT   New Prenatal with Woodrow Hurtado MD   [facility]    [address]  [place] [address]   [phone]            May 09, 2018  8:30 AM CDT   (Arrive by 8:15 AM)   US OB 2/3 TRIMESTER COMP TWINS with GHUS2   [facility])    [address]  [place] [address]   [phone]           Please bring a list of your medicines (including vitamins, minerals and over-the-counter drugs). Also, tell your doctor about any allergies you may have. Wear comfortable clothes and leave your valuables at home.  If you re less than 20 weeks drink four 8-ounce glasses of fluid an hour before your exam. If you need to empty your bladder before your exam, try to release only a little urine. Then, drink another glass of fluid.  You may have up to two family members in the exam room. If you bring a small child, an adult must be there to care for him or her.  Please call the Imaging Department at your  "Chief Complaint   Patient presents with     Anemia       Initial /70  Pulse 83  Temp 98.4  F (36.9  C) (Oral)  Wt 160 lb 12.8 oz (72.9 kg)  SpO2 98%  BMI 26.15 kg/m2 Estimated body mass index is 26.15 kg/(m^2) as calculated from the following:    Height as of 7/14/16: 5' 5.75\" (1.67 m).    Weight as of this encounter: 160 lb 12.8 oz (72.9 kg).  Medication Reconciliation: complete   Vicki Arreola MA      " "exam site with any questions.              Who to contact     If you have questions or need follow up information about today's clinic visit or your schedule please contact [facility] directly at [phone].  Normal or non-critical lab and imaging results will be communicated to you by MyChart, letter or phone within 4 business days after the clinic has received the results. If you do not hear from us within 7 days, please contact the clinic through MyChart or phone. If you have a critical or abnormal lab result, we will notify you by phone as soon as possible.  Submit refill requests through HiConversion or call your pharmacy and they will forward the refill request to us. Please allow 3 business days for your refill to be completed.          Additional Information About Your Visit        [employer] Information     HiConversion lets you send messages to your doctor, view your test results, renew your prescriptions, schedule appointments and more. To sign up, go to [email or web address] . Click on \"Log in\" on the left side of the screen, which will take you to the Welcome page. Then click on \"Sign up Now\" on the right side of the page.     You will be asked to enter the access code listed below, as well as some personal information. Please follow the directions to create your username and password.     Your access code is: [email or web address]  Expires: 6/3/2018  1:39 PM     Your access code will  in 90 days. If you need help or a new code, please call your [place] clinic or [phone].        Care EveryWhere ID     This is your Care EveryWhere ID. This could be used by other organizations to access your [place] medical records  [id number]        Your Vitals Were     Pulse Height Last Period BMI (Body Mass Index)          80 1.676 m (5' 6\") 2017 29.17 kg/m2         Blood Pressure from Last 3 Encounters:   18 110/70   18 114/72   18 112/84    Weight from Last 3 Encounters: "   03/27/18 82 kg (180 lb 11.2 oz)   03/05/18 79.8 kg (176 lb)   02/06/18 78 kg (172 lb)              We Performed the Following     *UA reflex to Microscopic- OB PANEL     ABO/Rh type and screen     HIV Antigen Antibody Combo     TSH GH        Primary Care Provider Fax #    Physician No Ref-Primary [phone]       No address on file        Equal Access to Services     [facility] : Hadii aad ku hadasho Soomaali, waaxda luqadaha, qaybta kaalmada adeegyada, waxay idiin hayaan adeeg armin latricia . So [facility] [phone].    ATENCIÓN: Si habla español, tiene a jiang disposición servicios gratuitos de asistencia lingüística. Llame al [phone].    We comply with applicable federal civil rights laws and [place] laws. We do not discriminate on the basis of race, color, national origin, age, disability, sex, sexual orientation, or gender identity.            Thank you!     Thank you for choosing [facility]  for your care. Our goal is always to provide you with excellent care. Hearing back from our patients is one way we can continue to improve our services. Please take a few minutes to complete the written survey that you may receive in the mail after your visit with us. Thank you!             Your Updated Medication List - Protect others around you: Learn how to safely use, store and throw away your medicines at www.disposemymeds.org.          This list is accurate as of 3/27/18 11:27 AM.  Always use your most recent med list.                   Brand Name Dispense Instructions for use Diagnosis    [facility] PRENATAL 28-0.8 MG Tabs      Take 1 tablet by mouth        SYNTHROID 75 MCG tablet   Generic drug:  levothyroxine

## 2018-04-09 ENCOUNTER — RADIANT APPOINTMENT (OUTPATIENT)
Dept: MAMMOGRAPHY | Facility: CLINIC | Age: 48
End: 2018-04-09
Attending: PHYSICIAN ASSISTANT
Payer: COMMERCIAL

## 2018-04-09 ENCOUNTER — OFFICE VISIT (OUTPATIENT)
Dept: FAMILY MEDICINE | Facility: CLINIC | Age: 48
End: 2018-04-09
Payer: COMMERCIAL

## 2018-04-09 VITALS
BODY MASS INDEX: 27.38 KG/M2 | OXYGEN SATURATION: 99 % | HEIGHT: 66 IN | HEART RATE: 67 BPM | TEMPERATURE: 98.5 F | DIASTOLIC BLOOD PRESSURE: 71 MMHG | WEIGHT: 170.4 LBS | SYSTOLIC BLOOD PRESSURE: 115 MMHG

## 2018-04-09 DIAGNOSIS — Z12.31 VISIT FOR SCREENING MAMMOGRAM: ICD-10-CM

## 2018-04-09 DIAGNOSIS — Z00.01 ENCOUNTER FOR ROUTINE ADULT HEALTH EXAMINATION WITH ABNORMAL FINDINGS: Primary | ICD-10-CM

## 2018-04-09 DIAGNOSIS — A63.0 CONDYLOMATA ACUMINATA IN FEMALE: ICD-10-CM

## 2018-04-09 PROCEDURE — 57061 DESTRUCTION VAG LESIONS SMPL: CPT | Performed by: PHYSICIAN ASSISTANT

## 2018-04-09 PROCEDURE — 77067 SCR MAMMO BI INCL CAD: CPT | Mod: TC

## 2018-04-09 PROCEDURE — 99396 PREV VISIT EST AGE 40-64: CPT | Mod: 25 | Performed by: PHYSICIAN ASSISTANT

## 2018-04-09 PROCEDURE — 87624 HPV HI-RISK TYP POOLED RSLT: CPT | Performed by: PHYSICIAN ASSISTANT

## 2018-04-09 PROCEDURE — G0145 SCR C/V CYTO,THINLAYER,RESCR: HCPCS | Performed by: PHYSICIAN ASSISTANT

## 2018-04-09 NOTE — MR AVS SNAPSHOT
After Visit Summary   4/9/2018    Pat Dan    MRN: [id number]           Patient Information     Date Of Birth          1970        Visit Information        Provider Department      4/9/2018 11:20 AM Raissa Kirby PA-C [facility]        Today's Diagnoses     Encounter for routine adult health examination with abnormal findings    -  1      Care Instructions      Preventive Health Recommendations  Female Ages 40 to 49    Yearly exam:     See your health care provider every year in order to  1. Review health changes.   2. Discuss preventive care.    3. Review your medicines if your doctor prescribed any.      Get a Pap test every three years (unless you have an abnormal result and your provider advises testing more often).      If you get Pap tests with HPV test, you only need to test every 5 years, unless you have an abnormal result. You do not need a Pap test if your uterus was removed (hysterectomy) and you have not had cancer.      You should be tested each year for STDs (sexually transmitted diseases), if you're at risk.       Ask your doctor if you should have a mammogram.      Have a colonoscopy (test for colon cancer) if someone in your family has had colon cancer or polyps before age 50.       Have a cholesterol test every 5 years.       Have a diabetes test (fasting glucose) after age 45. If you are at risk for diabetes, you should have this test every 3 years.    Shots: Get a flu shot each year. Get a tetanus shot every 10 years.     Nutrition:     Eat at least 5 servings of fruits and vegetables each day.    Eat whole-grain bread, whole-wheat pasta and brown rice instead of white grains and rice.    Talk to your provider about Calcium and Vitamin D.     Lifestyle    Exercise at least 150 minutes a week (an average of 30 minutes a day, 5 days a week). This will help you control your weight and prevent disease.    Limit alcohol to one drink per day.    No  "smoking.     Wear sunscreen to prevent skin cancer.    See your dentist every six months for an exam and cleaning.          Follow-ups after your visit        Future tests that were ordered for you today     Open Future Orders        Priority Expected Expires Ordered    *MA Screening Digital Bilateral Routine  4/9/2019 4/9/2018            Who to contact     Normal or non-critical lab and imaging results will be communicated to you by [employer], letter or phone within 4 business days after the clinic has received the results. If you do not hear from us within 7 days, please contact the clinic through [employer] or phone. If you have a critical or abnormal lab result, we will notify you by phone as soon as possible.  Submit refill requests through [employer] or call your pharmacy and they will forward the refill request to us. Please allow 3 business days for your refill to be completed.          If you need to speak with a  for additional information , please call: [phone]             Additional Information About Your Visit        [employer] Information     [employer] gives you secure access to your electronic health record. If you see a primary care provider, you can also send messages to your care team and make appointments. If you have questions, please call your primary care clinic.  If you do not have a primary care provider, please call [phone] and they will assist you.        Care EveryWhere ID     This is your Care EveryWhere ID. This could be used by other organizations to access your [place] medical records  [id number]        Your Vitals Were     Pulse Temperature Height Pulse Oximetry BMI (Body Mass Index)       67 98.5  F (36.9  C) (Tympanic) 5' 5.51\" (1.664 m) 99% 27.91 kg/m2        Blood Pressure from Last 3 Encounters:   04/09/18 115/71   09/12/17 109/73   04/24/17 104/70    Weight from Last 3 Encounters:   04/09/18 170 lb 6.4 oz (77.3 kg)   09/12/17 166 lb (75.3 kg)   04/24/17 160 lb " 12.8 oz (72.9 kg)              We Performed the Following     HPV High Risk Types DNA Cervical     Pap imaged thin layer screen with HPV - recommended age 30 - 65 years (select HPV order below)        Primary Care Provider Office Phone # Fax #    Raissa Kirby PA-C [phone] [address] [place] [address]        Equal Access to Services     [facility]: Hadii aad ku hadasho Soomaali, waaxda luqadaha, qaybta kaalmada adeegyada, waxay idiin hayaan adeeg kharash la'aan . So [facility] [phone].    ATENCIÓN: Si habla espnita, tiene a pierre disposición servicios gratuitos de asistencia lingüística. Llame al [phone].    We comply with applicable federal civil rights laws and [place] laws. We do not discriminate on the basis of race, color, national origin, age, disability, sex, sexual orientation, or gender identity.            Thank you!     Thank you for choosing [facility]  for your care. Our goal is always to provide you with excellent care. Hearing back from our patients is one way we can continue to improve our services. Please take a few minutes to complete the written survey that you may receive in the mail after your visit with us. Thank you!             Your Updated Medication List - Protect others around you: Learn how to safely use, store and throw away your medicines at www.disposemymeds.org.          This list is accurate as of 4/9/18 12:08 PM.  Always use your most recent med list.                   Brand Name Dispense Instructions for use Diagnosis    podofilox 0.5 % external solution    CONDYLOX    3.2 mL    Apply topically 2 times daily X 3 days, off treatment x 4 days. Cycle may be repeated up to 4 cycles.    Condylomata acuminata in female

## 2018-04-09 NOTE — PROGRESS NOTES
SUBJECTIVE:   CC: Pat Dan is an 47 year old woman who presents for preventive health visit.     Healthy Habits:    Do you get at least three servings of calcium containing foods daily (dairy, green leafy vegetables, etc.)? yes    Amount of exercise or daily activities, outside of work: 5 day(s) per week    Problems taking medications regularly No    Medication side effects: No    Have you had an eye exam in the past two years? yes    Do you see a dentist twice per year? yes    Do you have sleep apnea, excessive snoring or daytime drowsiness?no      PROBLEMS TO ADD ON...  Warts in the vaginal area that wont go away  Has been using podofilox  Confronted last partner who denied any sxs himself      Patient informed that anything we discuss that is not related to preventative medicine, may be billed for; patient verbalizes understanding.    Today's PHQ-2 Score:   PHQ-2 ( 1999 [employer]) 4/9/2018 9/12/2017   Q1: Little interest or pleasure in doing things 0 0   Q2: Feeling down, depressed or hopeless 0 0   PHQ-2 Score 0 0       Abuse: Current or Past(Physical, Sexual or Emotional)- No  Do you feel safe in your environment - Yes    Social History   Substance Use Topics     Smoking status: Never Smoker     Smokeless tobacco: Never Used     Alcohol use No     If you drink alcohol do you typically have >3 drinks per day or >7 drinks per week? No                     Reviewed orders with patient.  Reviewed health maintenance and updated orders accordingly - Yes  Labs reviewed in EPIC    Patient under age 50, mutual decision reflected in health maintenance.      Pertinent mammograms are reviewed under the imaging tab.  History of abnormal Pap smear:   NO - age 30- 65 PAP every 3 years recommended  Last 3 Pap and HPV Results:   PAP / HPV 4/9/2014 6/1/2005   PAP NIL NIL       Reviewed and updated as needed this visit by clinical staff  Tobacco  Allergies  Meds         Reviewed and updated as needed this visit by  "Provider        Past Medical History:   Diagnosis Date     Retained placenta without hemorrhage, unspecified as to episode of care 2005    placenta acreta     S/P hysterectomy     retained placenta        ROS:  Other than what is noted in the HPI and PMH a complete review of systems is otherwise negative including: Constitutional, HEENT, endocrine, cardiovascular, respiratory, GI/, musculoskeletal, neuro, and psychiatric.     OBJECTIVE:   /71  Pulse 67  Temp 98.5  F (36.9  C) (Tympanic)  Ht 5' 5.51\" (1.664 m)  Wt 170 lb 6.4 oz (77.3 kg)  SpO2 99%  BMI 27.91 kg/m2  EXAM:  GENERAL: healthy, alert and no distress  EYES: Eyes grossly normal to inspection, PERRL and conjunctivae and sclerae normal  HENT: ear canals and TM's normal, nose and mouth without ulcers or lesions  NECK: no adenopathy, no asymmetry, masses, or scars and thyroid normal to palpation  RESP: lungs clear to auscultation - no rales, rhonchi or wheezes  BREAST: normal without masses, tenderness or nipple discharge and no palpable axillary masses or adenopathy  CV: regular rate and rhythm, normal S1 S2, no S3 or S4, no murmur, click or rub, no peripheral edema and peripheral pulses strong  ABDOMEN: soft, nontender, no hepatosplenomegaly, no masses and bowel sounds normal   (female): normal urethral meatus , vaginal mucosa pink, moist, well rugated, normal cervix and 6 flesh colored papules of vulva and pubic area  MS: no gross musculoskeletal defects noted, no edema  SKIN: no suspicious lesions or rashes  NEURO: Normal strength and tone, mentation intact and speech normal  PSYCH: mentation appears normal, affect normal/bright    ASSESSMENT/PLAN:       ICD-10-CM    1. Encounter for routine adult health examination with abnormal findings Z00.01 Pap imaged thin layer screen with HPV - recommended age 30 - 65 years (select HPV order below)     HPV High Risk Types DNA Cervical     CANCELED: *MA Screening Digital Bilateral   2. Condylomata " "acuminata in female A63.0        Each papule was frozen easily x5s, 1-2 times with liquid nitrogen.  A total of 6 warts are treated today. Patient tolerated the procedure well and there were no complications. Will refreeze in 3 weeks if still present.    Pap today. Screenings discussed. Follow up annually otherwise.      COUNSELING:   Reviewed preventive health counseling, as reflected in patient instructions       reports that she has never smoked. She has never used smokeless tobacco.    Estimated body mass index is 27.91 kg/(m^2) as calculated from the following:    Height as of this encounter: 5' 5.51\" (1.664 m).    Weight as of this encounter: 170 lb 6.4 oz (77.3 kg).       Counseling Resources:  ATP IV Guidelines  Pooled Cohorts Equation Calculator  Breast Cancer Risk Calculator  FRAX Risk Assessment  ICSI Preventive Guidelines  Dietary Guidelines for Americans, 2010  USDA's MyPlate  ASA Prophylaxis  Lung CA Screening    Raissa Kirby PA-C  [facility] [place]  "

## 2018-04-12 LAB
COPATH REPORT: NORMAL
PAP: NORMAL

## 2018-04-16 LAB
FINAL DIAGNOSIS: NORMAL
HPV HR 12 DNA CVX QL NAA+PROBE: NEGATIVE
HPV16 DNA SPEC QL NAA+PROBE: NEGATIVE
HPV18 DNA SPEC QL NAA+PROBE: NEGATIVE
SPECIMEN DESCRIPTION: NORMAL
SPECIMEN SOURCE CVX/VAG CYTO: NORMAL

## 2019-09-29 ENCOUNTER — HEALTH MAINTENANCE LETTER (OUTPATIENT)
Age: 49
End: 2019-09-29

## 2019-12-04 ENCOUNTER — ANCILLARY PROCEDURE (OUTPATIENT)
Dept: GENERAL RADIOLOGY | Facility: CLINIC | Age: 49
End: 2019-12-04
Attending: PEDIATRICS
Payer: OTHER MISCELLANEOUS

## 2019-12-04 ENCOUNTER — OFFICE VISIT (OUTPATIENT)
Dept: ORTHOPEDICS | Facility: CLINIC | Age: 49
End: 2019-12-04
Payer: OTHER MISCELLANEOUS

## 2019-12-04 VITALS
TEMPERATURE: 98.1 F | HEIGHT: 66 IN | SYSTOLIC BLOOD PRESSURE: 116 MMHG | DIASTOLIC BLOOD PRESSURE: 80 MMHG | BODY MASS INDEX: 29.73 KG/M2 | WEIGHT: 185 LBS

## 2019-12-04 DIAGNOSIS — M25.531 RIGHT WRIST PAIN: Primary | ICD-10-CM

## 2019-12-04 DIAGNOSIS — S61.511A WRIST LACERATION, RIGHT, INITIAL ENCOUNTER: ICD-10-CM

## 2019-12-04 DIAGNOSIS — M25.531 RIGHT WRIST PAIN: ICD-10-CM

## 2019-12-04 PROCEDURE — 99204 OFFICE O/P NEW MOD 45 MIN: CPT | Performed by: PEDIATRICS

## 2019-12-04 PROCEDURE — 73110 X-RAY EXAM OF WRIST: CPT | Mod: RT

## 2019-12-04 ASSESSMENT — MIFFLIN-ST. JEOR: SCORE: 1472.96

## 2019-12-04 NOTE — PATIENT INSTRUCTIONS
Routine Icing  Over the counter medication: Acetaminophen (Tylenol) 1000mg every 6 hours with food (Maximum of 3000mg/day) or/with   Ibuprofen (Advil) 600-800mg three times a day with food  Activity modification discussed; letter provided  Wrist brace for comfort/support  Elevate as needed  MRI right wrist next; will call with results     Advanced imaging is done by appointment. Some insurance require a prior authorization to be completed which may delay the time until you are able to schedule your appointment. You should be receiving a call from the scheduling department, if you have not heard from them in 24-48 hours.   Please call [place] [place] [facility] [place]: [phone] to schedule your right wrist MRI.  Depending on your availability you can usually schedule within the next 1-2 days.    The clinic will call you with results, if you have not heard from the clinic within 3-4 days following your MRI please contact us at the number listed below.

## 2019-12-04 NOTE — PROGRESS NOTES
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    **  Patient denies any previous issues prior to laceration. 4 weeks ago, patient was standing in a corner, turned around which caused a pint glass to shatter. 1 piece of glass cut her dorsal wrist and then landed on the floor. Patient was bleeding profusely upon injury with some glass shards lined up on her ulnar hand. Patient washed, taped and wrapped injury. 4-6 hours later she washed with peroxide and applied bandage. Injury is tender to touch and swollen.     Currently she has severe pain at rest, worst pain since onset. Sharp throbbing pain located on right dorsal and ulnar side of wrist with shooting pains up to the right forearm, lateral and medial elbow; Pain has gradually worsened the past 3 days. Tightness noted last week. Due to a busy schedule, she doesn't notice pain till the evening after work. Positive for fingers being numb and tingly last night. No N/T currently.    Apart from pain making her nauseous occasionally, she doesn't report any other ill symptoms. Has been taking 600mg of Ibuprofen at a time for symptom relief.       Injury: Gradual onset of intense aching pain over the last one week.  Severe pain with swelling and weakness over the last one day.    Location of Pain: right ulnar sided wrist  Duration of Pain: 4 weeks, worse over last one week  Rating of Pain at worst: 9/10  Rating of Pain Currently: 8/10  Symptoms are better with: Rest  Symptoms are worse with: direct pressure, moving wrist  Additional Features:   Positive: swelling and weakness  Other evaluation and/or treatments so far consists of: Ibuprofen, Rest and cleaned wound immediately following injury.  Prior History of related problems: none    Social History: works for a  [employer]    Review of Systems  Musculoskeletal: as above  Remainder of review of systems is negative including constitutional, CV, pulmonary, GI, Skin and Neurologic except as noted in HPI or medical history.    Past Medical History:   Diagnosis Date     Retained placenta without hemorrhage, unspecified as to episode of care 2005    placenta acreta     S/P hysterectomy     retained placenta     Past Surgical History:   Procedure Laterality Date     SURGICAL HISTORY OF -   age 16    laparoscopy     SURGICAL HISTORY OF -   12/16/05    D & C, supracervical hysterectomy     Family History   Problem Relation Age of Onset     Genitourinary Problems Maternal Grandmother         kidney disease     Genetic Disorder Other         Cystic fibrosis     Breast Cancer Paternal Aunt 55     Breast Cancer Cousin 42     Social History     Socioeconomic History     Marital status:      Spouse name: Not on file     Number of children: Not on file     Years of education: Not on file     Highest education level: Not on file   Occupational History     Not on file   Social Needs     Financial resource strain: Not on file     Food insecurity:     Worry: Not on file     Inability: Not on file     Transportation needs:     Medical: Not on file     Non-medical: Not on file   Tobacco Use     Smoking status: Never Smoker     Smokeless tobacco: Never Used   Substance and Sexual Activity     Alcohol use: No     Drug use: No     Sexual activity: Not Currently     Partners: Male     Birth control/protection: Surgical     Comment: hyster.   Lifestyle     Physical activity:     Days per week: Not on file     Minutes per session: Not on file     Stress: Not on file   Relationships     Social connections:     Talks on phone: Not on file     Gets together: Not on file     Attends Rastafari service: Not on file     Active member of club or organization: Not on file     Attends meetings of clubs or organizations: Not on file     Relationship status: Not  "on file     Intimate partner violence:     Fear of current or ex partner: Not on file     Emotionally abused: Not on file     Physically abused: Not on file     Forced sexual activity: Not on file   Other Topics Concern     Parent/sibling w/ CABG, MI or angioplasty before 65F 55M? Not Asked   Social History Narrative     Not on file   This document serves as a record of the services and decisions personally performed and made by DO PAOLO Torres. It was created on his behalf by Payton Vidales, a trained medical scribe. The creation of this document is based the provider's statements to the medical scribe.    Scribe Payton Vidales 3:52 PM 12/4/2019      Objective  /80   Temp 98.1  F (36.7  C)   Ht 1.664 m (5' 5.5\")   Wt 83.9 kg (185 lb)   BMI 30.32 kg/m      GENERAL APPEARANCE: healthy, alert and no distress   GAIT: NORMAL  SKIN: no suspicious lesions or rashes  NEURO: Normal strength and tone, mentation intact and speech normal  PSYCH:  mentation appears normal and affect normal/bright  HEENT: no scleral icterus  CV: distal perfusion intact  RESP: nonlabored breathing    Exam:  Hand/wrist (Right):    Inspection:       No deformity noted. Right hand dorsal wrist soft tissue swelling, fullness.        Laceration: <1cm, located Radial to ulnar styloid. No erythema. Wound is healing well, slightly pink, no opening or drainage. Ecchymosis noted on distal ulnar forearm, proximal to wrist, not at laceration site    Motion:       Limited Wrist flexion with pain; not as much compared to extension.       Moderately limited wrist extension with pain       Wrist radial deviation with pain       Wrist ulnar deviation limited with pain       No change in pain with Digit motion at rest on pillow  Pain at dorsal ulnar aspect of the wrist with above ROM    Sensation:  Grossly intact .    Radial pulses normal, +2/4, capillary refill brisk.    Palpation:  Tender: diffuse. Dorsal forearm, Dorsal hand, ulnar " hand/wrist  Non-tender: Elbow, volar forearm and remainder hand    Skin:       well perfused       capillary refill brisk    Regional pulses normal    Radiology:  Visualized radiographs of right wrist, and reviewed the images with the patient.  Impression: no fracture noted. No clear foreign body.    Recent Results (from the past 24 hour(s))   XR Wrist Right G/E 3 Views    Narrative    XR WRIST RT G/E 3 VW 12/4/2019 3:40 PM     HISTORY: Ulnar wrist pain, evaluate for bony injury vs foreign body;  Right wrist pain    COMPARISON: None.      Impression    IMPRESSION: No fracture or osseous lesion. Normal alignment. No  foreign body is evident.    KAVIN BRYANT MD         Assessment:  1. Right wrist pain    2. Wrist laceration, right, initial encounter       Plan:  Discussed the assessment with the patient. Suspect this is related to initial injury, with residual inflammation, and with increased pain and inflammation due to her work with repetitive motion. Differential diagnosis also includes infectious (unlikely, as afebrile, and duration of time since injury), retained foreign body, tendon injury.    Radiologic images reviewed and discussed with patient today.  We discussed the following: symptom treatment, activity modification/rest, imaging, medication, support for the affected area and lab studies. Following discussion, plan:  Ice routinely  Elevate prn  Activity modification discussed; Work restriction letter written.  Discussed consideration of lab studies, given symptoms; hold for now  Imaging: MRI ordered of the right wrist. Plan next, see assessment above.  Support: Discussed use of wrist brace; brace provided, some benefit in comfort with brace on.  Follow up: Clinic will contact patient with MRI results, sooner as needed   Questions answered. The patient indicates understanding of these issues and agrees with the plan.    Kavin Holden DO, CAQ        Patient Instructions   Routine Icing  Over the  counter medication: Acetaminophen (Tylenol) 1000mg every 6 hours with food (Maximum of 3000mg/day) or/with   Ibuprofen (Advil) 600-800mg three times a day with food  Activity modification discussed; letter provided  Wrist brace for comfort/support  Elevate as needed  MRI right wrist next; will call with results     Advanced imaging is done by appointment. Some insurance require a prior authorization to be completed which may delay the time until you are able to schedule your appointment. You should be receiving a call from the scheduling department, if you have not heard from them in 24-48 hours.   Please call [place] [place] [facility] [place]: [phone] to schedule your right wrist MRI.  Depending on your availability you can usually schedule within the next 1-2 days.    The clinic will call you with results, if you have not heard from the clinic within 3-4 days following your MRI please contact us at the number listed below.             Disclaimer: This note consists of symbols derived from keyboarding, dictation and/or voice recognition software. As a result, there may be errors in the script that have gone undetected. Please consider this when interpreting information found in this chart.    The information in this document, created by a scribe for me, accurately reflects the services I personally performed and the decisions made by me. I have reviewed and approved this document for accuracy.

## 2019-12-04 NOTE — LETTER
[facility] [place]  [address]  [place] [address]  Phone: [phone]  Fax: [phone]      December 4, 2019      RE: Pat Dan  [address] [place] [address]        To whom it may concern:    Pat Dan was seen in clinic today for evaluation of an injury. The employee is ABLE to return to work next scheduled work date. Anticipate further investigation with additional imaging of the wrist. In the meantime, discussed routine icing, use of over the counter medication for pain, elevation for soreness, and use of wrist brace for comfort/support. Plan MRI right wrist next.      Restrictions:  No independent lift/carry/push/pull with right upper extremity. May use as helper for left if pain free.  No use of tools/equipment with right upper extremity. No gripping/grasping, no outstretched arm on right. Avoid repetitive motion right upper extremity.      Will call with MRI results. Anticipate update on MRI results and next steps in the next few days.          Sincerely,              Kavin TERRY.

## 2019-12-05 ENCOUNTER — ANCILLARY PROCEDURE (OUTPATIENT)
Dept: MRI IMAGING | Facility: CLINIC | Age: 49
End: 2019-12-05
Attending: PEDIATRICS
Payer: OTHER MISCELLANEOUS

## 2019-12-05 DIAGNOSIS — M25.531 RIGHT WRIST PAIN: ICD-10-CM

## 2019-12-05 DIAGNOSIS — S61.511A WRIST LACERATION, RIGHT, INITIAL ENCOUNTER: ICD-10-CM

## 2019-12-05 PROCEDURE — 73221 MRI JOINT UPR EXTREM W/O DYE: CPT | Mod: TC

## 2019-12-09 ENCOUNTER — TELEPHONE (OUTPATIENT)
Dept: ORTHOPEDICS | Facility: CLINIC | Age: 49
End: 2019-12-09

## 2019-12-09 NOTE — TELEPHONE ENCOUNTER
Subacute ulnar styloid fracture, not clearly seen on x-ray. This explains her pain. Possible she struck this wrist with initial injury. Otherwise, some tendinitis in the area as well.  As she is ~4 weeks from injury, fracture is nondisplaced, I think that continued routine use of wrist brace should be adequate. If desiring more support, would offer casting.  Recheck 2-3 weeks with repeat x-ray, sooner prn.  Thanks.  Kavin Holden DO, CAQ

## 2019-12-09 NOTE — TELEPHONE ENCOUNTER
Called and spoke with patient. Relayed MRI results per Dr. Holden.  She plan on wearing the brace full time and follow up in 2-3 weeks.  She will contact clinic for any further questions.     Alma Buckley ATC

## 2019-12-09 NOTE — TELEPHONE ENCOUNTER
Patient LVM requesting MRI results.       Results for orders placed or performed in visit on 12/05/19   MR Wrist Right w/o Contrast    Narrative    MR WRIST RIGHT WITHOUT CONTRAST 12/5/2019 7:57 PM    HISTORY:  Dorsal wrist laceration 4 weeks ago. Now with increased pain  and swelling.    TECHNIQUE: Coronal T1, STIR, gradient echo.  Sagittal T1.  Axial T1  and T2 fat suppression.    COMPARISON:  Plain film 12/4/2019.    FINDINGS:   Osseous and Cartilaginous Structures: There is a healing nondisplaced  fracture of the ulnar styloid. This fracture cannot be seen on the  recent plain films due to its longitudinal oblique orientation and the  specific plain film projections obtained. No other acute or  significant chronic bony abnormality.     Scapholunate and Lunotriquetral Ligaments: No definite tear  identified, although MR arthrography would be the study of choice in  this regard, if indicated clinically.    Triangular Fibrocartilage Complex: Mild degenerative signal near the  radial attachment. No evidence for tear/perforation.    Extensor Tendons: The first through fifth extensor compartments are  unremarkable. The extensor carpi ulnaris tendon shows intrasubstance  increased signal consistent with mild tendinosis/tendinitis (image 17  of series 8). There is also minimal tendon sheath fluid and/or  tenosynovitis related to the extensor carpi ulnaris (image 20 of  series 8).    Flexor Tendons: Unremarkable. No tear, tendinosis, or tenosynovitis  identified.    Joint Spaces:  No significant joint effusion.     Additional Findings: The carpal tunnel and median nerve appear  unremarkable. Guyon's canal is unremarkable. No ganglion cyst  identified. There is nonspecific dorsal subcutaneous edema. No fluid  collections to indicate abscess. A small amount of volar subcutaneous  edema is also seen.      Impression    IMPRESSION:   1. Obliquely oriented acute/subacute nondisplaced fracture of the  ulnar styloid.  2. Mild  extensor carpi ulnaris tendinosis/tendinitis with possible  tenosynovitis.  3. Nonspecific dorsal and volar subcutaneous edema.    CHARMAINE MCKEON MD

## 2019-12-28 ENCOUNTER — OFFICE VISIT (OUTPATIENT)
Dept: ORTHOPEDICS | Facility: CLINIC | Age: 49
End: 2019-12-28
Payer: OTHER MISCELLANEOUS

## 2019-12-28 VITALS
HEIGHT: 67 IN | HEART RATE: 83 BPM | BODY MASS INDEX: 29.41 KG/M2 | DIASTOLIC BLOOD PRESSURE: 86 MMHG | SYSTOLIC BLOOD PRESSURE: 119 MMHG | WEIGHT: 187.4 LBS

## 2019-12-28 DIAGNOSIS — S52.614A CLOSED NONDISPLACED FRACTURE OF STYLOID PROCESS OF RIGHT ULNA, INITIAL ENCOUNTER: ICD-10-CM

## 2019-12-28 DIAGNOSIS — M25.531 RIGHT WRIST PAIN: Primary | ICD-10-CM

## 2019-12-28 DIAGNOSIS — M77.8 RIGHT WRIST TENDONITIS: ICD-10-CM

## 2019-12-28 PROCEDURE — 99214 OFFICE O/P EST MOD 30 MIN: CPT | Performed by: PHYSICAL MEDICINE & REHABILITATION

## 2019-12-28 ASSESSMENT — MIFFLIN-ST. JEOR: SCORE: 1507.67

## 2019-12-28 NOTE — LETTER
December 28, 2019      Pat Dan  [address] [facility] [id number]        To Whom It May Concern:    Pat Dan  was seen on 12/28/19 for a right wrist injury.  She may need time off of work, however, she can work as tolerated at this time.  Would recommend avoid repetitive activities of the wrist.        Sincerely,        Avani Zaragoza MD

## 2019-12-28 NOTE — PATIENT INSTRUCTIONS
-Occupational therapy ordered at the [facility].  Please call [phone] to schedule.  Please do 5-6 days of exercises per week between formal sessions and the home exercises they provide.  -Continue bracing of the wrist.  We also discussed casting.    -Would recommend time off from work.  Letter provided to work as tolerated.  Would avoid repetitive activities of the wrist.  Letter provided.   -Ice or heat 15-20 minutes as needed (Avoid sleeping on a heating pad or ice)  -Patient's preferred over the counter medications as directed on packaging as needed for pain or soreness.  Please take ibuprofen with food.  -Over the counter lidocaine cream as needed (i.e. Aspercreme or generic equivalent)  -Avoid aggravating activities.    Follow up in 2 weeks for re-evaluation.  Please call with questions or concerns.

## 2019-12-28 NOTE — PROGRESS NOTES
Sports Medicine Clinic Visit - Interim History December 28, 2019      PCP: Raissa Kirby Helen Dan is a 49 year old female who is seen in follow up for a right wrist injury.  She was initially seen by Dr. Holden on 12/4/19 as an [facility] patient.  Four weeks prior to that appointment, a pint glass shattered and cut her wrist.  She developed right wrist pain over the dorsal and ulnar side of the wrist that worsened since onset.  She also had shooting pains up to the right forearm, lateral elbow, and medial elbow.  Dr. Holden ordered an MRI of the wrist which showed an acute/subacute nondisplaced fracture of the ulnar styloid.  Since then, she has continued to use the brace provided except for at work.  At work, she uses a neoprene brace because she is unable to  coffee mugs and do other work activities with the initial brace.  She notes her pain get severe pain with movement of the wrist, but does not have pain if she is not using the wrist.  She wanted to work through the holidays, while trying to modifying activities at work, but continues to have pain with certain activities such as grabbing, turning, flipping, pulling, and changing the gears in her car.  Simple ADLs such as brushing her hair cause significant pain.  She has been using KT tape that seems to help.  Aspercreme, ibuprofen, and Tylenol are also helpful until she tries to use the wrist.  The pain starts in the wrist and radiates up her arm.      - Now ~ 7 weeks from initial injury       Review of Systems  Musculoskeletal: as above  Remainder of review of systems is negative including constitutional, eyes, ENT, CV, pulmonary, GI, , endocrine, skin, hematologic, and neurologic except as noted in HPI or medical history.    History reviewed. No pertinent past surgical/medical/family/social history other than as mentioned in HPI.    Patient Active Problem List   Diagnosis     Shoulder pain     Condylomata acuminata in female      Past Medical History:   Diagnosis Date     Retained placenta without hemorrhage, unspecified as to episode of care 2005    placenta acreta     S/P hysterectomy     retained placenta     Past Surgical History:   Procedure Laterality Date     SURGICAL HISTORY OF -   age 16    laparoscopy     SURGICAL HISTORY OF -   12/16/05    D & C, supracervical hysterectomy     Family History   Problem Relation Age of Onset     Genitourinary Problems Maternal Grandmother         kidney disease     Genetic Disorder Other         Cystic fibrosis     Breast Cancer Paternal Aunt 55     Breast Cancer Cousin 42     Social History     Socioeconomic History     Marital status:      Spouse name: Not on file     Number of children: Not on file     Years of education: Not on file     Highest education level: Not on file   Occupational History     Not on file   Social Needs     Financial resource strain: Not on file     Food insecurity:     Worry: Not on file     Inability: Not on file     Transportation needs:     Medical: Not on file     Non-medical: Not on file   Tobacco Use     Smoking status: Never Smoker     Smokeless tobacco: Never Used   Substance and Sexual Activity     Alcohol use: No     Drug use: No     Sexual activity: Not Currently     Partners: Male     Birth control/protection: Surgical     Comment: hyster.   Lifestyle     Physical activity:     Days per week: Not on file     Minutes per session: Not on file     Stress: Not on file   Relationships     Social connections:     Talks on phone: Not on file     Gets together: Not on file     Attends [employer] service: Not on file     Active member of club or organization: Not on file     Attends meetings of clubs or organizations: Not on file     Relationship status: Not on file     Intimate partner violence:     Fear of current or ex partner: Not on file     Emotionally abused: Not on file     Physically abused: Not on file     Forced sexual activity: Not on file   Other  "Topics Concern     Parent/sibling w/ CABG, MI or angioplasty before 65F 55M? Not Asked   Social History Narrative     Not on file       She is a  and a     Current Outpatient Medications   Medication     podofilox (CONDYLOX) 0.5 % external solution     No current facility-administered medications for this visit.      No Known Allergies      Objective:  /86   Pulse 83   Ht 1.702 m (5' 7\")   Wt 85 kg (187 lb 6.4 oz)   BMI 29.35 kg/m      General: Alert and in no distress    Head: Normocephalic, atraumatic  Eyes: no scleral icterus or conjunctival erythema   Oropharynx:  Mucous membranes moist  Skin: no erythema, petechiae, or jaundice  CV: regular rhythm by palpation, 2+ distal pulses  Resp: normal respiratory effort without conversational dyspnea   Psych: normal mood and affect    Gait: Non-antalgic, appropriate coordination and balance   Neuro: Motor strength and sensation as noted below    Musculoskeletal:    Bilateral Wrist and Hand exam    Inspection:       No swelling, bruising or deformity bilaterally    Palpation:  -Tender over the right ulnar styloid, dorsal wrist (radial and ulnar aspects), dorsal mid forearm musculature.  No tenderness over the right elbow and right hand.    ROM:  -Right wrist extension is decreased and painful.  Right forearm supination and pronation are also painful    Strength:  Forearm supination 5/5 bilaterally  Forearm pronation 5/5 bilaterally  Wrist extension 4/5 right, 5/5 left  Wrist flexion 5/5 bilaterally   strength 4/5 right, 5/5 left  Finger abduction 5/5 bilaterally    Neurovascular:       2+ radial pulses bilaterally and normal sensation to light touch in the radial, median and ulnar nerve distributions      Radiology:  Independent visualization of images performed and reviewed with Pat.  Recent Results (from the past 744 hour(s))   XR Wrist Right G/E 3 Views    Narrative    XR WRIST RT G/E 3 VW 12/4/2019 3:40 PM     HISTORY: Ulnar wrist pain, " evaluate for bony injury vs foreign body;  Right wrist pain    COMPARISON: None.      Impression    IMPRESSION: No fracture or osseous lesion. Normal alignment. No  foreign body is evident.    MARY BRYANT MD   MR Wrist Right w/o Contrast    Narrative    MR WRIST RIGHT WITHOUT CONTRAST 12/5/2019 7:57 PM    HISTORY:  Dorsal wrist laceration 4 weeks ago. Now with increased pain  and swelling.    TECHNIQUE: Coronal T1, STIR, gradient echo.  Sagittal T1.  Axial T1  and T2 fat suppression.    COMPARISON:  Plain film 12/4/2019.    FINDINGS:   Osseous and Cartilaginous Structures: There is a healing nondisplaced  fracture of the ulnar styloid. This fracture cannot be seen on the  recent plain films due to its longitudinal oblique orientation and the  specific plain film projections obtained. No other acute or  significant chronic bony abnormality.     Scapholunate and Lunotriquetral Ligaments: No definite tear  identified, although MR arthrography would be the study of choice in  this regard, if indicated clinically.    Triangular Fibrocartilage Complex: Mild degenerative signal near the  radial attachment. No evidence for tear/perforation.    Extensor Tendons: The first through fifth extensor compartments are  unremarkable. The extensor carpi ulnaris tendon shows intrasubstance  increased signal consistent with mild tendinosis/tendinitis (image 17  of series 8). There is also minimal tendon sheath fluid and/or  tenosynovitis related to the extensor carpi ulnaris (image 20 of  series 8).    Flexor Tendons: Unremarkable. No tear, tendinosis, or tenosynovitis  identified.    Joint Spaces:  No significant joint effusion.     Additional Findings: The carpal tunnel and median nerve appear  unremarkable. Guyon's canal is unremarkable. No ganglion cyst  identified. There is nonspecific dorsal subcutaneous edema. No fluid  collections to indicate abscess. A small amount of volar subcutaneous  edema is also seen.       Impression    IMPRESSION:   1. Obliquely oriented acute/subacute nondisplaced fracture of the  ulnar styloid.  2. Mild extensor carpi ulnaris tendinosis/tendinitis with possible  tenosynovitis.  3. Nonspecific dorsal and volar subcutaneous edema.    CHARMAINE MCKEON MD       Assessment:  1. Right wrist pain    2. Closed nondisplaced fracture of styloid process of right ulna, initial encounter    3. Right wrist tendonitis        Plan:  Discussed the assessment with the patient and developed a plan together:  -Pat continues to have pain in the right wrist.  Certain activities at work as well as some ADLs at home worsen her pain significantly.  She does not typically have pain if not using the wrist.  MRI completed on 12/5/19 showed an acute/subacute nondisplaced fracture of the ulnar styloid as well as mild extensor tendinitis.  Reviewed these results and images with Pat.  Pain on exam is at the ulnar styloid but also at the dorsal wrist and extensor musculature in the forearm.  She has significant pain with right wrist extension.  I believe her pain is due to both the fracture and the tendinitis.  We discussed possible management options.  Would recommend she consider time off of work if it is increases symptoms; she would like to work as tolerated.  She should avoid repetitive activities of the wrist.  Letter provided stating these recommendations.  We also discussed casting.  The cast would provide more support and would limit voluntary and inadvertent aggravating activities of the wrist/forearm.  However, she was unable to work with the soft wrist brace (had to use a neoprene brace she purchased over the counter), and the cast would be more restrictive in nature.  We also discussed hand therapy which I think would be helpful for range of motion, strength, and pain sensitivity.  She is receptive to this and is eager to proceed.  Additionally, she asks about a steroid injection.  This is not something I would  recommend at this juncture.  She expressed understanding.    -Occupational therapy ordered at the [facility].  Please call [phone] to schedule.  Please do 5-6 days of exercises per week between formal sessions and the home exercises they provide.  -Continue bracing of the wrist.  We also discussed casting.    -Would recommend time off from work; she would prefer to begin therapy and work as tolerated.  This is reasonable.  Would avoid repetitive activities of the wrist.  Letter provided.    -Ice or heat 15-20 minutes as needed (Avoid sleeping on a heating pad or ice)  -Patient's preferred over the counter medications as directed on packaging as needed for pain or soreness.  Please take ibuprofen with food.  -Over the counter lidocaine cream as needed (i.e. Aspercreme or generic equivalent)  -Avoid aggravating activities.    Follow up in 2 weeks for re-evaluation.  Please call with questions or concerns.      Time spent in one-on-one evaluation and discussion with patient regarding nature of problem, course, prior treatments, and therapeutic options, at least 50% of which was spent in counseling and coordination of care:  25 minutes.        Mirella Zaragoza MD, [facility]  [facility]

## 2019-12-28 NOTE — LETTER
12/28/2019         RE: Pat Dan  [address] [place] [address]        Dear Colleague,    Thank you for referring your patient, Pat Dan, to the [facility] [place]. Please see a copy of my visit note below.    Sports Medicine Clinic Visit - Interim History December 28, 2019      PCP: Raissa Kirby    Pat Dan is a 49 year old female who is seen in follow up for a right wrist injury.  She was initially seen by Dr. Holden on 12/4/19 as an [facility] patient.  Four weeks prior to that appointment, a pint glass shattered and cut her wrist.  She developed right wrist pain over the dorsal and ulnar side of the wrist that worsened since onset.  She also had shooting pains up to the right forearm, lateral elbow, and medial elbow.  Dr. Holden ordered an MRI of the wrist which showed an acute/subacute nondisplaced fracture of the ulnar styloid.  Since then, she has continued to use the brace provided except for at work.  At work, she uses a neoprene brace because she is unable to  coffee mugs and do other work activities with the initial brace.  She notes her pain get severe pain with movement of the wrist, but does not have pain if she is not using the wrist.  She wanted to work through the holidays, while trying to modifying activities at work, but continues to have pain with certain activities such as grabbing, turning, flipping, pulling, and changing the gears in her car.  Simple ADLs such as brushing her hair cause significant pain.  She has been using KT tape that seems to help.  Aspercreme, ibuprofen, and Tylenol are also helpful until she tries to use the wrist.  The pain starts in the wrist and radiates up her arm.      - Now ~ 7 weeks from initial injury       Review of Systems  Musculoskeletal: as above  Remainder of review of systems is negative including constitutional, eyes, ENT, CV, pulmonary, GI, , endocrine, skin, hematologic,  and neurologic except as noted in HPI or medical history.    History reviewed. No pertinent past surgical/medical/family/social history other than as mentioned in HPI.    Patient Active Problem List   Diagnosis     Shoulder pain     Condylomata acuminata in female     Past Medical History:   Diagnosis Date     Retained placenta without hemorrhage, unspecified as to episode of care 2005    placenta acreta     S/P hysterectomy     retained placenta     Past Surgical History:   Procedure Laterality Date     SURGICAL HISTORY OF -   age 16    laparoscopy     SURGICAL HISTORY OF -   12/16/05    D & C, supracervical hysterectomy     Family History   Problem Relation Age of Onset     Genitourinary Problems Maternal Grandmother         kidney disease     Genetic Disorder Other         Cystic fibrosis     Breast Cancer Paternal Aunt 55     Breast Cancer Cousin 42     Social History     Socioeconomic History     Marital status:      Spouse name: Not on file     Number of children: Not on file     Years of education: Not on file     Highest education level: Not on file   Occupational History     Not on file   Social Needs     Financial resource strain: Not on file     Food insecurity:     Worry: Not on file     Inability: Not on file     Transportation needs:     Medical: Not on file     Non-medical: Not on file   Tobacco Use     Smoking status: Never Smoker     Smokeless tobacco: Never Used   Substance and Sexual Activity     Alcohol use: No     Drug use: No     Sexual activity: Not Currently     Partners: Male     Birth control/protection: Surgical     Comment: hyster.   Lifestyle     Physical activity:     Days per week: Not on file     Minutes per session: Not on file     Stress: Not on file   Relationships     Social connections:     Talks on phone: Not on file     Gets together: Not on file     Attends Scientologist service: Not on file     Active member of club or organization: Not on file     Attends meetings of  "clubs or organizations: Not on file     Relationship status: Not on file     Intimate partner violence:     Fear of current or ex partner: Not on file     Emotionally abused: Not on file     Physically abused: Not on file     Forced sexual activity: Not on file   Other Topics Concern     Parent/sibling w/ CABG, MI or angioplasty before 65F 55M? Not Asked   Social History Narrative     Not on file       She is a  and a     Current Outpatient Medications   Medication     podofilox (CONDYLOX) 0.5 % external solution     No current facility-administered medications for this visit.      No Known Allergies      Objective:  /86   Pulse 83   Ht 1.702 m (5' 7\")   Wt 85 kg (187 lb 6.4 oz)   BMI 29.35 kg/m       General: Alert and in no distress    Head: Normocephalic, atraumatic  Eyes: no scleral icterus or conjunctival erythema   Oropharynx:  Mucous membranes moist  Skin: no erythema, petechiae, or jaundice  CV: regular rhythm by palpation, 2+ distal pulses  Resp: normal respiratory effort without conversational dyspnea   Psych: normal mood and affect    Gait: Non-antalgic, appropriate coordination and balance   Neuro: Motor strength and sensation as noted below    Musculoskeletal:    Bilateral Wrist and Hand exam    Inspection:       No swelling, bruising or deformity bilaterally    Palpation:  -Tender over the right ulnar styloid, dorsal wrist (radial and ulnar aspects), dorsal mid forearm musculature.  No tenderness over the right elbow and right hand.    ROM:  -Right wrist extension is decreased and painful.  Right forearm supination and pronation are also painful    Strength:  Forearm supination 5/5 bilaterally  Forearm pronation 5/5 bilaterally  Wrist extension 4/5 right, 5/5 left  Wrist flexion 5/5 bilaterally   strength 4/5 right, 5/5 left  Finger abduction 5/5 bilaterally    Neurovascular:       2+ radial pulses bilaterally and normal sensation to light touch in the radial, median and " ulnar nerve distributions      Radiology:  Independent visualization of images performed and reviewed with Pat.  Recent Results (from the past 744 hour(s))   XR Wrist Right G/E 3 Views    Narrative    XR WRIST RT G/E 3 VW 12/4/2019 3:40 PM     HISTORY: Ulnar wrist pain, evaluate for bony injury vs foreign body;  Right wrist pain    COMPARISON: None.      Impression    IMPRESSION: No fracture or osseous lesion. Normal alignment. No  foreign body is evident.    MARY BRYANT MD   MR Wrist Right w/o Contrast    Narrative    MR WRIST RIGHT WITHOUT CONTRAST 12/5/2019 7:57 PM    HISTORY:  Dorsal wrist laceration 4 weeks ago. Now with increased pain  and swelling.    TECHNIQUE: Coronal T1, STIR, gradient echo.  Sagittal T1.  Axial T1  and T2 fat suppression.    COMPARISON:  Plain film 12/4/2019.    FINDINGS:   Osseous and Cartilaginous Structures: There is a healing nondisplaced  fracture of the ulnar styloid. This fracture cannot be seen on the  recent plain films due to its longitudinal oblique orientation and the  specific plain film projections obtained. No other acute or  significant chronic bony abnormality.     Scapholunate and Lunotriquetral Ligaments: No definite tear  identified, although MR arthrography would be the study of choice in  this regard, if indicated clinically.    Triangular Fibrocartilage Complex: Mild degenerative signal near the  radial attachment. No evidence for tear/perforation.    Extensor Tendons: The first through fifth extensor compartments are  unremarkable. The extensor carpi ulnaris tendon shows intrasubstance  increased signal consistent with mild tendinosis/tendinitis (image 17  of series 8). There is also minimal tendon sheath fluid and/or  tenosynovitis related to the extensor carpi ulnaris (image 20 of  series 8).    Flexor Tendons: Unremarkable. No tear, tendinosis, or tenosynovitis  identified.    Joint Spaces:  No significant joint effusion.     Additional Findings: The  carpal tunnel and median nerve appear  unremarkable. Guyon's canal is unremarkable. No ganglion cyst  identified. There is nonspecific dorsal subcutaneous edema. No fluid  collections to indicate abscess. A small amount of volar subcutaneous  edema is also seen.      Impression    IMPRESSION:   1. Obliquely oriented acute/subacute nondisplaced fracture of the  ulnar styloid.  2. Mild extensor carpi ulnaris tendinosis/tendinitis with possible  tenosynovitis.  3. Nonspecific dorsal and volar subcutaneous edema.    CHARMAINE MCKEON MD       Assessment:  1. Right wrist pain    2. Closed nondisplaced fracture of styloid process of right ulna, initial encounter    3. Right wrist tendonitis        Plan:  Discussed the assessment with the patient and developed a plan together:  -Pat continues to have pain in the right wrist.  Certain activities at work as well as some ADLs at home worsen her pain significantly.  She does not typically have pain if not using the wrist.  MRI completed on 12/5/19 showed an acute/subacute nondisplaced fracture of the ulnar styloid as well as mild extensor tendinitis.  Reviewed these results and images with Pat.  Pain on exam is at the ulnar styloid but also at the dorsal wrist and extensor musculature in the forearm.  She has significant pain with right wrist extension.  I believe her pain is due to both the fracture and the tendinitis.  We discussed possible management options.  Would recommend she consider time off of work if it is increases symptoms; she would like to work as tolerated.  She should avoid repetitive activities of the wrist.  Letter provided stating these recommendations.  We also discussed casting.  The cast would provide more support and would limit voluntary and inadvertent aggravating activities of the wrist/forearm.  However, she was unable to work with the soft wrist brace (had to use a neoprene brace she purchased over the counter), and the cast would be more  restrictive in nature.  We also discussed hand therapy which I think would be helpful for range of motion, strength, and pain sensitivity.  She is receptive to this and is eager to proceed.  Additionally, she asks about a steroid injection.  This is not something I would recommend at this juncture.  She expressed understanding.    -Occupational therapy ordered at the [facility].  Please call [phone] to schedule.  Please do 5-6 days of exercises per week between formal sessions and the home exercises they provide.  -Continue bracing of the wrist.  We also discussed casting.    -Would recommend time off from work; she would prefer to begin therapy and work as tolerated.  This is reasonable.  Would avoid repetitive activities of the wrist.  Letter provided.    -Ice or heat 15-20 minutes as needed (Avoid sleeping on a heating pad or ice)  -Patient's preferred over the counter medications as directed on packaging as needed for pain or soreness.  Please take ibuprofen with food.  -Over the counter lidocaine cream as needed (i.e. Aspercreme or generic equivalent)  -Avoid aggravating activities.    Follow up in 2 weeks for re-evaluation.  Please call with questions or concerns.      Time spent in one-on-one evaluation and discussion with patient regarding nature of problem, course, prior treatments, and therapeutic options, at least 50% of which was spent in counseling and coordination of care:  25 minutes.        Mirella Zaragoza MD, [facility] Sports Medicine  [facility]      Again, thank you for allowing me to participate in the care of your patient.        Sincerely,        Avani Zaragoza MD

## 2020-01-06 ENCOUNTER — THERAPY VISIT (OUTPATIENT)
Dept: OCCUPATIONAL THERAPY | Facility: CLINIC | Age: 50
End: 2020-01-06
Attending: PHYSICAL MEDICINE & REHABILITATION
Payer: OTHER MISCELLANEOUS

## 2020-01-06 DIAGNOSIS — S52.614A CLOSED NONDISPLACED FRACTURE OF STYLOID PROCESS OF RIGHT ULNA, INITIAL ENCOUNTER: ICD-10-CM

## 2020-01-06 DIAGNOSIS — M77.8 RIGHT WRIST TENDONITIS: ICD-10-CM

## 2020-01-06 DIAGNOSIS — M25.531 RIGHT WRIST PAIN: Primary | ICD-10-CM

## 2020-01-06 PROCEDURE — 97112 NEUROMUSCULAR REEDUCATION: CPT | Mod: GO | Performed by: OCCUPATIONAL THERAPIST

## 2020-01-06 PROCEDURE — 97165 OT EVAL LOW COMPLEX 30 MIN: CPT | Mod: GO | Performed by: OCCUPATIONAL THERAPIST

## 2020-01-06 PROCEDURE — 97110 THERAPEUTIC EXERCISES: CPT | Mod: GO | Performed by: OCCUPATIONAL THERAPIST

## 2020-01-06 NOTE — PROGRESS NOTES
Hand Therapy Initial Evaluation    Current Date:  1/6/2020    Subjective:  Pat Dan is a 49 year old right hand dominant female.    Diagnosis:   Right wrist pain, ulnar styloid fracture, mild ECU tendinitis   DOI:  12/28/19 (therapy referral)    Patient reports symptoms of pain, stiffness/loss of motion, weakness/loss of strength, edema, numbness and tingling of the right wrist and hand which occurred due to laceration on broken glass 2 months ago. Since onset symptoms are unchanged  Special tests:  x-ray and MRI.  Previous treatment: OTC wrist splint x 1 month and taping, NSAID's, heat, ice.  General health as reported by patient is good.  Pertinent medical history includes:None  Medical allergies:none.  Surgical history: other: hysterectomy.  Medication history: None.    Occupational Profile Information:  Current occupation is    Currently working in normal job without restrictions  Job Tasks: Lifting, Carrying, Prolonged Standing, Repetitive Tasks  Prior functional level:  independent-all household chores  Barriers include:none  Mobility: No difficulty  Transportation: drives    Functional Outcome Measure:  Upper Extremity Functional Index  SCORE:   Column Totals: 25/80  (A lower score indicates greater disability.)    Pain Level (Scale 0-10)   1/6/2020   Least 2   With Use 8-9     Pain Description  NOTE: patient tearful during session due to pain  Date 1/6/2020   Location Ulnar wrist and extensors   Pain Quality Aching, Sharp, Throbbing and Tiring   Frequency constant     Pain is worst  end of day, after work   Exacerbated by  brush, teeth, hair, pour coffee, twisting    Relieved by NSAIDs, rest and splint/taping   Progression Unchanged      Edema  Mild ulnar wrist    Scar  Small laceration dorsal ulnar wrist, mildly tender, mildly adherent     Sensation  Decreased intermittently in Ulnar Nerve distribution per pt report     ROM  Pain Report: - none  + mild    ++ moderate    +++ severe   Wrist  1/6/2020 1/6/2020   AROM (PROM) Left Right   Extension 70 35+   Flexion 55 25+   RD 20 10-   UD 40 10++   Supination 75 60+   Pronation 80 80+     Strength   (Measured in pounds)  Pain Report: - none  + mild    ++ moderate    +++ severe    1/6/2020 1/6/2020   Trials Left Right   1  2  3 70  67  70 14+  12+  14+   Average 69 13     Lat Pinch 1/6/2020 1/6/2020   Trials Left Right   1  2  3 15  13  14 8+  7+  7+   Average 14 7     3 Pt Pinch 1/6/2020 1/6/2020   Trials Left Right   1  2  3 12  13  12 6+  6+  7+   Average 13 6     Palpation   1/6/2020   Radial wrist/1st dc -   Dorsal wrist/EDC -   DRUJ +   Ulnar wrist/ECU ++   TFCC ++   Volar wrist/FCU -   Volar wrist/FCR -   Extensors +     Assessment:  Patient presents with symptoms consistent with diagnosis of ulnar wrist pain, with conservative intervention.     Patient's limitations or Problem List includes:  Pain, Decreased ROM/motion, Increased edema, Weakness, Sensory disturbance, Adherent scarring, Decreased stability, Decreased , Decreased pinch and Tightness in musculature of the right wrist and hand which interferes with the patient's ability to perform Self Care Tasks (dressing, eating, bathing), Work Tasks, Sleep Patterns, Recreational Activities, Household Chores and Driving  as compared to previous level of function.    Rehab Potential:  Good - Return to full activity, some limitations    Patient will benefit from skilled Occupational Therapy to increase ROM, flexibility, overall strength,  strength, pinch strength, stability of wrist and sensation and decrease pain, edema and adherence of scarring to return to previous activity level and resume normal daily tasks and to reach their rehab potential.    Barriers to Learning:  No barrier    Communication Issues:  Patient appears to be able to clearly communicate and understand verbal and written communication and follow directions correctly.    Chart Review: Chart Review and Simple history  review with patient    Identified Performance Deficits: bathing/showering, dressing, hygiene and grooming, driving and community mobility, home establishment and management, meal preparation and cleanup, sleep, work and leisure activities    Assessment of Occupational Performance:  5 or more Performance Deficits    Clinical Decision Making (Complexity): Low complexity    Treatment Explanation:  The following has been discussed with the patient:  RX ordered/plan of care  Anticipated outcomes  Possible risks and side effects    Plan:  Frequency:  1 X week, once daily  Duration:  for  6 weeks  Treatment Plan:    Modalities:    US   Therapeutic Exercise:    AROM, PROM, Isotonics, Isometrics and Stabilization  Neuromuscular re-ed:   Kinesiotaping  Manual Techniques:   Friction massage and Myofascial release  Orthotic Fabrication:    Forearm based orthosis  Self Care:    Self Care Tasks and Ergonomic Considerations    Discharge Plan:  Achieve all LTG.  Independent in home treatment program.  Reach maximal therapeutic benefit.    Home Exercise Program:  Warmth for stiffness to wrist and forearm extensors  Ice to ulnar wrist after activity for pain and swell  TFM to dorsal ulnar wrist/ECU  MFR to forearm extensors with tennis ball  Gentle pain free AROM wrist E/F, R/U and P/S  Trial kinesiotape to ulnar wrist  OTC wrist splint sleeping and per symptoms day    Next Visit:  See in 1 week  Recommend MD f/u if pt feels she is in need of work restrictions   Assess response to HEP and taping  Progress to wrist isometrics and stabilization as tolerated  Consider US to ulnar wrist and custom ulnar gutter orthosis if no change

## 2020-01-13 ENCOUNTER — OFFICE VISIT (OUTPATIENT)
Dept: ORTHOPEDICS | Facility: CLINIC | Age: 50
End: 2020-01-13
Payer: OTHER MISCELLANEOUS

## 2020-01-13 ENCOUNTER — ANCILLARY PROCEDURE (OUTPATIENT)
Dept: GENERAL RADIOLOGY | Facility: CLINIC | Age: 50
End: 2020-01-13
Attending: PEDIATRICS
Payer: OTHER MISCELLANEOUS

## 2020-01-13 VITALS
HEIGHT: 67 IN | BODY MASS INDEX: 29.35 KG/M2 | DIASTOLIC BLOOD PRESSURE: 78 MMHG | WEIGHT: 187 LBS | SYSTOLIC BLOOD PRESSURE: 118 MMHG

## 2020-01-13 DIAGNOSIS — S52.614A CLOSED NONDISPLACED FRACTURE OF STYLOID PROCESS OF RIGHT ULNA, INITIAL ENCOUNTER: ICD-10-CM

## 2020-01-13 DIAGNOSIS — S52.614D CLOSED NONDISPLACED FRACTURE OF STYLOID PROCESS OF RIGHT ULNA WITH ROUTINE HEALING, SUBSEQUENT ENCOUNTER: ICD-10-CM

## 2020-01-13 DIAGNOSIS — M25.531 RIGHT WRIST PAIN: Primary | ICD-10-CM

## 2020-01-13 PROCEDURE — 99213 OFFICE O/P EST LOW 20 MIN: CPT | Mod: 25 | Performed by: PEDIATRICS

## 2020-01-13 PROCEDURE — 29075 APPL CST ELBW FNGR SHORT ARM: CPT | Mod: RT | Performed by: PEDIATRICS

## 2020-01-13 PROCEDURE — 73110 X-RAY EXAM OF WRIST: CPT | Mod: RT

## 2020-01-13 ASSESSMENT — MIFFLIN-ST. JEOR: SCORE: 1505.86

## 2020-01-13 NOTE — PROGRESS NOTES
"Sports Medicine Clinic Visit    PCP: Raissa Kirby Helen Dan is a 49 year old female who is seen in f/u up for    Right wrist pain  Closed nondisplaced fracture of styloid process of right ulna with routine healing, subsequent encounter. Since last visit on 12/28/2019 with Dr Zaragoza patient has continued to have pain in her wrist.   Has begun OT.   Feels she continues to have aching and stiffness thru her wrist.  Has been using her wrist brace while at work, but has been weaning out of the brace while at home.    Does have an increase in soreness without the brace, so she has been using it intermittently at home.      DOI:  11/1/19, 9-10 weeks    **  Still with ache, some sharp pain.  Seen in OT, had eval.  Has noted a little improved motion with therapy, but still with other symptoms.      Review of Systems  All other systems reviewed and are negative unless noted above.    Past Medical History:   Diagnosis Date     Retained placenta without hemorrhage, unspecified as to episode of care 2005    placenta acreta     S/P hysterectomy     retained placenta     Past Surgical History:   Procedure Laterality Date     SURGICAL HISTORY OF -   age 16    laparoscopy     SURGICAL HISTORY OF -   12/16/05    D & C, supracervical hysterectomy       Objective  /78   Ht 1.702 m (5' 7\")   Wt 84.8 kg (187 lb)   BMI 29.29 kg/m      GENERAL APPEARANCE: healthy, alert and no distress   GAIT: NORMAL  SKIN: no suspicious lesions or rashes  NEURO: Normal strength and tone, mentation intact and speech normal  PSYCH:  mentation appears normal and affect normal/bright  HEENT: no scleral icterus  CV: distal perfusion intact  RESP: nonlabored breathing    Exam  Right wrist:  Tender ulnar aspect, ulnar styloid  No significant swelling or ecchymosis noted  Pain ulnar aspect with motion, with mod limitation in motion supination, flexion, extension      Radiology  Visualized radiographs of right wrist obtained today, and " reviewed the images with the patient.  Impression: subtle irregularity at the ulnar styloid consistent with known nondisplaced fracture from prior MRI, best seen on PA view. Otherwise, no significant change compared to prior x-ray from 12/4/19.    Recent Results (from the past 24 hour(s))   XR Wrist Right G/E 3 Views    Narrative    XR WRIST RT G/E 3 VW 1/13/2020 4:51 PM     HISTORY: Closed nondisplaced fracture of styloid process of right  ulna, initial encounter      Impression    IMPRESSION: Negative exam.    LISA SONG MD       Prior MRI right wrist reviewed:    Results for orders placed or performed in visit on 12/05/19   MR Wrist Right w/o Contrast    Narrative    MR WRIST RIGHT WITHOUT CONTRAST 12/5/2019 7:57 PM    HISTORY:  Dorsal wrist laceration 4 weeks ago. Now with increased pain  and swelling.    TECHNIQUE: Coronal T1, STIR, gradient echo.  Sagittal T1.  Axial T1  and T2 fat suppression.    COMPARISON:  Plain film 12/4/2019.    FINDINGS:   Osseous and Cartilaginous Structures: There is a healing nondisplaced  fracture of the ulnar styloid. This fracture cannot be seen on the  recent plain films due to its longitudinal oblique orientation and the  specific plain film projections obtained. No other acute or  significant chronic bony abnormality.     Scapholunate and Lunotriquetral Ligaments: No definite tear  identified, although MR arthrography would be the study of choice in  this regard, if indicated clinically.    Triangular Fibrocartilage Complex: Mild degenerative signal near the  radial attachment. No evidence for tear/perforation.    Extensor Tendons: The first through fifth extensor compartments are  unremarkable. The extensor carpi ulnaris tendon shows intrasubstance  increased signal consistent with mild tendinosis/tendinitis (image 17  of series 8). There is also minimal tendon sheath fluid and/or  tenosynovitis related to the extensor carpi ulnaris (image 20 of  series 8).    Flexor  Tendons: Unremarkable. No tear, tendinosis, or tenosynovitis  identified.    Joint Spaces:  No significant joint effusion.     Additional Findings: The carpal tunnel and median nerve appear  unremarkable. Guyon's canal is unremarkable. No ganglion cyst  identified. There is nonspecific dorsal subcutaneous edema. No fluid  collections to indicate abscess. A small amount of volar subcutaneous  edema is also seen.      Impression    IMPRESSION:   1. Obliquely oriented acute/subacute nondisplaced fracture of the  ulnar styloid.  2. Mild extensor carpi ulnaris tendinosis/tendinitis with possible  tenosynovitis.  3. Nonspecific dorsal and volar subcutaneous edema.    CHARMAINE MCKEON MD         Assessment:  1. Right wrist pain    2. Closed nondisplaced fracture of styloid process of right ulna with routine healing, subsequent encounter        Plan:  Discussed the assessment with the patient. Ongoing symptoms from injury, with known fracture; she did not have initial immobilization after injury, and has continued to remain fairly active with injury, including with work.  We discussed a focused time of increased support for the wrist, and decreased use. She feels she is at a point with work that she can reduce activities.  Discussed support options. These include ongoing use of brace, custom orthosis in OT, and short arm cast. Following discussion, will increase support to short arm cast, placed today. Cast care reviewed.  Will put therapy on hold. Anticipate can return to therapy in future to address tendon issues, and stiffness.  Follow up: ~3 weeks, cast removal and recheck, sooner prn. Likely repeat x-ray at recheck as well, unless all symptoms resolved.  Questions answered. The patient indicates understanding of these issues and agrees with the plan.    Kavin Holden DO, PAOLO            Cast/splint application  Date/Time: 1/13/2020 5:26 PM  Performed by: Kavin Holden DO  Authorized by: Kavin Holden  Alan, DO     Consent:     Consent obtained:  Verbal    Consent given by:  Patient    Risks discussed:  Discoloration, numbness, pain and swelling    Alternatives discussed:  No treatment and alternative treatment  Pre-procedure details:     Sensation:  Normal  Procedure details:     Laterality:  Right    Location:  Wrist    Wrist:  R wrist    Strapping: no      Cast type:  Short arm    Supplies:  Fiberglass  Post-procedure details:     Pain:  Improved    Sensation:  Normal    Patient tolerance of procedure:  Tolerated well, no immediate complications    Patient provided with cast or splint care instructions: Yes              Patient Instructions   Ongoing symptoms at the wrist.  Reviewed the imaging, with fracture and tendinitis/tendon change.  Plan to continue with support for the fracture; will increase level of support. Short arm cast placed today.  Letter updated for work. Limit use of the right upper extremity as able, avoid activities that cause pain.  Anticipate follow up ~3 weeks for cast removal and recheck, sooner if needed.         Caring for Your Cast     A cast is used to protect an injured body part and allow it to heal by limiting the amount of motion occurring around the injury. Pain and swelling of the injured area is normal for 48 hours after your cast is put on. If you have swelling, wiggle your toes or fingers to ease it. Doing so encourages blood flow to your arm or leg.     It is important that you keep your cast dry, unless your doctor tells you differently. If the padding of the cast gets wet, your skin may be damaged and become infected. When showering or taking a bath, put the cast in a heavy plastic bag that can be held in place with a rubber band. If your cast gets wet and does not dry out in four to five hours, call your doctor s office.   To keep the cast clean, use wash clothes or baby wipes around it.   You may experience some itching inside the cast. This is normal. Avoid putting  anything in the cast, even your finger, as you can injure your skin and cause infection. Try shaking some talcum powder or blowing cool air from a hair dryer into the cast to ease itching.   If these signs or symptoms develop, call your doctor immediately.       Pain gets worse     Swelling that cuts off blood flow that does not go away, even when you lift the body part above the level of your heart     Fever after itching. It may be related to an infection.     Fluid draining from your skin under the cast     Your cast may become loose as swelling goes down. If the cast feels too loose or if it is so loose you can take it off, call your doctor s office.     Your doctor or  will give you recommendations for activity based on your injury. Some sports allow casts if properly padded by a doctor or .     For complete healing, your cast should only be removed at the direction of your doctor or clinic staff. A special saw ensures its safe removal and protects the skin and other tissue under the cast.               Disclaimer: This note consists of symbols derived from keyboarding, dictation and/or voice recognition software. As a result, there may be errors in the script that have gone undetected. Please consider this when interpreting information found in this chart.

## 2020-01-13 NOTE — LETTER
[facility] [place]  [address]  [address]  [place] [address]  Phone: [phone]  Fax: [phone]      January 13, 2020      RE: Pat Dan  [address]  [place] [address]        To whom it may concern:    Pat Dan was seen in clinic today for re-evaluation of an injury, right ulnar styloid fracture. The employee is ABLE to return to work next scheduled work date. may still do icing, use of over the counter medication for pain, elevation for soreness. Short arm cast placed today.      Restrictions:  No independent lift/carry/push/pull with right upper extremity. May use as helper for left if completely pain free.  No use of tools/equipment with right upper extremity. No gripping/grasping, no outstretched arm on right. No repetitive motion right upper extremity.    If unable to accommodate restrictions due to ongoing symptoms from the injury, then may need to remain off work until recheck.    Anticipate recheck in approximately 3 weeks.        Sincerely,              Kavin MAO

## 2020-01-13 NOTE — LETTER
"    1/13/2020         RE: Pat Dan  [address] [place] [address]        Dear Colleague,    Thank you for referring your patient, Pat Dan, to the [facility] [place]. Please see a copy of my visit note below.    Sports Medicine Clinic Visit    PCP: Raissa Kirby    Pat Dan is a 49 year old female who is seen in f/u up for    Right wrist pain  Closed nondisplaced fracture of styloid process of right ulna with routine healing, subsequent encounter. Since last visit on 12/28/2019 with Dr Zaragoza patient has continued to have pain in her wrist.   Has begun OT.   Feels she continues to have aching and stiffness thru her wrist.  Has been using her wrist brace while at work, but has been weaning out of the brace while at home.    Does have an increase in soreness without the brace, so she has been using it intermittently at home.      DOI:  11/1/19, 9-10 weeks    **  Still with ache, some sharp pain.  Seen in OT, had eval.  Has noted a little improved motion with therapy, but still with other symptoms.      Review of Systems  All other systems reviewed and are negative unless noted above.    Past Medical History:   Diagnosis Date     Retained placenta without hemorrhage, unspecified as to episode of care 2005    placenta acreta     S/P hysterectomy     retained placenta     Past Surgical History:   Procedure Laterality Date     SURGICAL HISTORY OF -   age 16    laparoscopy     SURGICAL HISTORY OF -   12/16/05    D & C, supracervical hysterectomy       Objective  /78   Ht 1.702 m (5' 7\")   Wt 84.8 kg (187 lb)   BMI 29.29 kg/m       GENERAL APPEARANCE: healthy, alert and no distress   GAIT: NORMAL  SKIN: no suspicious lesions or rashes  NEURO: Normal strength and tone, mentation intact and speech normal  PSYCH:  mentation appears normal and affect normal/bright  HEENT: no scleral icterus  CV: distal perfusion intact  RESP: nonlabored " breathing    Exam  Right wrist:  Tender ulnar aspect, ulnar styloid  No significant swelling or ecchymosis noted  Pain ulnar aspect with motion, with mod limitation in motion supination, flexion, extension      Radiology  Visualized radiographs of right wrist obtained today, and reviewed the images with the patient.  Impression: subtle irregularity at the ulnar styloid consistent with known nondisplaced fracture from prior MRI, best seen on PA view. Otherwise, no significant change compared to prior x-ray from 12/4/19.    Recent Results (from the past 24 hour(s))   XR Wrist Right G/E 3 Views    Narrative    XR WRIST RT G/E 3 VW 1/13/2020 4:51 PM     HISTORY: Closed nondisplaced fracture of styloid process of right  ulna, initial encounter      Impression    IMPRESSION: Negative exam.    LISA SONG MD       Prior MRI right wrist reviewed:    Results for orders placed or performed in visit on 12/05/19   MR Wrist Right w/o Contrast    Narrative    MR WRIST RIGHT WITHOUT CONTRAST 12/5/2019 7:57 PM    HISTORY:  Dorsal wrist laceration 4 weeks ago. Now with increased pain  and swelling.    TECHNIQUE: Coronal T1, STIR, gradient echo.  Sagittal T1.  Axial T1  and T2 fat suppression.    COMPARISON:  Plain film 12/4/2019.    FINDINGS:   Osseous and Cartilaginous Structures: There is a healing nondisplaced  fracture of the ulnar styloid. This fracture cannot be seen on the  recent plain films due to its longitudinal oblique orientation and the  specific plain film projections obtained. No other acute or  significant chronic bony abnormality.     Scapholunate and Lunotriquetral Ligaments: No definite tear  identified, although MR arthrography would be the study of choice in  this regard, if indicated clinically.    Triangular Fibrocartilage Complex: Mild degenerative signal near the  radial attachment. No evidence for tear/perforation.    Extensor Tendons: The first through fifth extensor compartments are  unremarkable. The  extensor carpi ulnaris tendon shows intrasubstance  increased signal consistent with mild tendinosis/tendinitis (image 17  of series 8). There is also minimal tendon sheath fluid and/or  tenosynovitis related to the extensor carpi ulnaris (image 20 of  series 8).    Flexor Tendons: Unremarkable. No tear, tendinosis, or tenosynovitis  identified.    Joint Spaces:  No significant joint effusion.     Additional Findings: The carpal tunnel and median nerve appear  unremarkable. Guyon's canal is unremarkable. No ganglion cyst  identified. There is nonspecific dorsal subcutaneous edema. No fluid  collections to indicate abscess. A small amount of volar subcutaneous  edema is also seen.      Impression    IMPRESSION:   1. Obliquely oriented acute/subacute nondisplaced fracture of the  ulnar styloid.  2. Mild extensor carpi ulnaris tendinosis/tendinitis with possible  tenosynovitis.  3. Nonspecific dorsal and volar subcutaneous edema.    CHARMAINE MCKEON MD         Assessment:  1. Right wrist pain    2. Closed nondisplaced fracture of styloid process of right ulna with routine healing, subsequent encounter        Plan:  Discussed the assessment with the patient. Ongoing symptoms from injury, with known fracture; she did not have initial immobilization after injury, and has continued to remain fairly active with injury, including with work.  We discussed a focused time of increased support for the wrist, and decreased use. She feels she is at a point with work that she can reduce activities.  Discussed support options. These include ongoing use of brace, custom orthosis in OT, and short arm cast. Following discussion, will increase support to short arm cast, placed today. Cast care reviewed.  Will put therapy on hold. Anticipate can return to therapy in future to address tendon issues, and stiffness.  Follow up: ~3 weeks, cast removal and recheck, sooner prn. Likely repeat x-ray at recheck as well, unless all symptoms  resolved.  Questions answered. The patient indicates understanding of these issues and agrees with the plan.    Kavin Holden DO CAQ            Cast/splint application  Date/Time: 1/13/2020 5:26 PM  Performed by: Kavin Holden DO  Authorized by: Kavin Holden DO     Consent:     Consent obtained:  Verbal    Consent given by:  Patient    Risks discussed:  Discoloration, numbness, pain and swelling    Alternatives discussed:  No treatment and alternative treatment  Pre-procedure details:     Sensation:  Normal  Procedure details:     Laterality:  Right    Location:  Wrist    Wrist:  R wrist    Strapping: no      Cast type:  Short arm    Supplies:  Fiberglass  Post-procedure details:     Pain:  Improved    Sensation:  Normal    Patient tolerance of procedure:  Tolerated well, no immediate complications    Patient provided with cast or splint care instructions: Yes              Patient Instructions   Ongoing symptoms at the wrist.  Reviewed the imaging, with fracture and tendinitis/tendon change.  Plan to continue with support for the fracture; will increase level of support. Short arm cast placed today.  Letter updated for work. Limit use of the right upper extremity as able, avoid activities that cause pain.  Anticipate follow up ~3 weeks for cast removal and recheck, sooner if needed.         Caring for Your Cast     A cast is used to protect an injured body part and allow it to heal by limiting the amount of motion occurring around the injury. Pain and swelling of the injured area is normal for 48 hours after your cast is put on. If you have swelling, wiggle your toes or fingers to ease it. Doing so encourages blood flow to your arm or leg.     It is important that you keep your cast dry, unless your doctor tells you differently. If the padding of the cast gets wet, your skin may be damaged and become infected. When showering or taking a bath, put the cast in a heavy plastic bag that can be  held in place with a rubber band. If your cast gets wet and does not dry out in four to five hours, call your doctor s office.   To keep the cast clean, use wash clothes or baby wipes around it.   You may experience some itching inside the cast. This is normal. Avoid putting anything in the cast, even your finger, as you can injure your skin and cause infection. Try shaking some talcum powder or blowing cool air from a hair dryer into the cast to ease itching.   If these signs or symptoms develop, call your doctor immediately.       Pain gets worse     Swelling that cuts off blood flow that does not go away, even when you lift the body part above the level of your heart     Fever after itching. It may be related to an infection.     Fluid draining from your skin under the cast     Your cast may become loose as swelling goes down. If the cast feels too loose or if it is so loose you can take it off, call your doctor s office.     Your doctor or  will give you recommendations for activity based on your injury. Some sports allow casts if properly padded by a doctor or .     For complete healing, your cast should only be removed at the direction of your doctor or clinic staff. A special saw ensures its safe removal and protects the skin and other tissue under the cast.               Disclaimer: This note consists of symbols derived from keyboarding, dictation and/or voice recognition software. As a result, there may be errors in the script that have gone undetected. Please consider this when interpreting information found in this chart.        Again, thank you for allowing me to participate in the care of your patient.        Sincerely,        Kavin Holden,

## 2020-01-13 NOTE — PATIENT INSTRUCTIONS
Ongoing symptoms at the wrist.  Reviewed the imaging, with fracture and tendinitis/tendon change.  Plan to continue with support for the fracture; will increase level of support. Short arm cast placed today.  Letter updated for work. Limit use of the right upper extremity as able, avoid activities that cause pain.  Anticipate follow up ~3 weeks for cast removal and recheck, sooner if needed.         Caring for Your Cast     A cast is used to protect an injured body part and allow it to heal by limiting the amount of motion occurring around the injury. Pain and swelling of the injured area is normal for 48 hours after your cast is put on. If you have swelling, wiggle your toes or fingers to ease it. Doing so encourages blood flow to your arm or leg.     It is important that you keep your cast dry, unless your doctor tells you differently. If the padding of the cast gets wet, your skin may be damaged and become infected. When showering or taking a bath, put the cast in a heavy plastic bag that can be held in place with a rubber band. If your cast gets wet and does not dry out in four to five hours, call your doctor s office.   To keep the cast clean, use wash clothes or baby wipes around it.   You may experience some itching inside the cast. This is normal. Avoid putting anything in the cast, even your finger, as you can injure your skin and cause infection. Try shaking some talcum powder or blowing cool air from a hair dryer into the cast to ease itching.   If these signs or symptoms develop, call your doctor immediately.       Pain gets worse     Swelling that cuts off blood flow that does not go away, even when you lift the body part above the level of your heart     Fever after itching. It may be related to an infection.     Fluid draining from your skin under the cast     Your cast may become loose as swelling goes down. If the cast feels too loose or if it is so loose you can take it off, call your doctor s  office.     Your doctor or  will give you recommendations for activity based on your injury. Some sports allow casts if properly padded by a doctor or .     For complete healing, your cast should only be removed at the direction of your doctor or clinic staff. A special saw ensures its safe removal and protects the skin and other tissue under the cast.

## 2020-01-31 ENCOUNTER — ANCILLARY PROCEDURE (OUTPATIENT)
Dept: GENERAL RADIOLOGY | Facility: CLINIC | Age: 50
End: 2020-01-31
Attending: FAMILY MEDICINE
Payer: OTHER MISCELLANEOUS

## 2020-01-31 ENCOUNTER — OFFICE VISIT (OUTPATIENT)
Dept: ORTHOPEDICS | Facility: CLINIC | Age: 50
End: 2020-01-31
Payer: OTHER MISCELLANEOUS

## 2020-01-31 VITALS
WEIGHT: 187 LBS | DIASTOLIC BLOOD PRESSURE: 75 MMHG | BODY MASS INDEX: 29.35 KG/M2 | SYSTOLIC BLOOD PRESSURE: 120 MMHG | HEIGHT: 67 IN

## 2020-01-31 DIAGNOSIS — S52.614D CLOSED NONDISPLACED FRACTURE OF STYLOID PROCESS OF RIGHT ULNA WITH ROUTINE HEALING, SUBSEQUENT ENCOUNTER: Primary | ICD-10-CM

## 2020-01-31 DIAGNOSIS — S52.614D CLOSED NONDISPLACED FRACTURE OF STYLOID PROCESS OF RIGHT ULNA WITH ROUTINE HEALING, SUBSEQUENT ENCOUNTER: ICD-10-CM

## 2020-01-31 DIAGNOSIS — M77.8 RIGHT WRIST TENDONITIS: ICD-10-CM

## 2020-01-31 PROCEDURE — 73110 X-RAY EXAM OF WRIST: CPT | Mod: RT

## 2020-01-31 PROCEDURE — 99213 OFFICE O/P EST LOW 20 MIN: CPT | Performed by: FAMILY MEDICINE

## 2020-01-31 ASSESSMENT — MIFFLIN-ST. JEOR: SCORE: 1505.86

## 2020-01-31 NOTE — PROGRESS NOTES
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    She was initially seen by Dr. Holden on 19 as an AIC patient. He ordered an MRI of the wrist which showed an acute/subacute nondisplaced fracture of the ulnar styloid. Since last visit with Dr. Holden patient has been wearing a cast. Patient complains that a small lip on the inside of cast has caused pain, numbness and tingling from her thumb rubbing on that spot. She will be going out of town for 3 days and would like the cast to be taken off since it was coming off at her appointment on the  that she had scheduled.     Social History: works at a [employer]    Review of Systems  Musculoskeletal: as above  Remainder of review of systems is negative including constitutional, CV, pulmonary, GI, Skin and Neurologic except as noted in HPI or medical history.    Past Medical History:   Diagnosis Date     Retained placenta without hemorrhage, unspecified as to episode of care     placenta acreta     S/P hysterectomy     retained placenta     Past Surgical History:   Procedure Laterality Date     SURGICAL HISTORY OF -   age 16    laparoscopy     SURGICAL HISTORY OF -   5    D & C, supracervical hysterectomy     Family History   Problem Relation Age of Onset     Genitourinary Problems Maternal Grandmother         kidney disease     Genetic Disorder Other         Cystic fibrosis     Breast Cancer Paternal Aunt 55     Breast Cancer Cousin 42         Objective  /75 (BP Location: Left arm, Patient Position: Chair)   Ht 1.702 m (5' 7\")   Wt 84.8 kg (187 lb)   BMI 29.29 kg/m        General: healthy, alert and in no distress      HEENT: no scleral icterus or conjunctival erythema     Skin: no suspicious lesions or rash. No jaundice.     CV: regular " rhythm by palpation, 2+ distal pulses, no pedal edema      Resp: normal respiratory effort without conversational dyspnea     Psych: normal mood and affect      Gait: nonantalgic, appropriate coordination and balance     Neuro: normal light touch sensory exam of the extremities. Motor strength as noted below     Right Wrist and Hand exam    Inspection:       No swelling, bruising or deformity right and      Healing laceration over dorsal wrist, 1 cm    Tender:       Very mild over ulnar wrist joint, minimal TTP of the ulnar styloid, improved, mild TTP of the ECU    Non Tender:       Remainder of the Wrist and Hand right    ROM:       Decreased active and passive ROM of the wrist with flexion and extension right, near full and painfree ROM in ulnar and radial deviation and very mild pain with pronation/supination over lateal wrist    Strength:       Motor function intact    Neurovascular:       2+ radial pulses bilaterally with brisk capillary refill and      normal sensation to light touch in the radial, median and ulnar nerve distributions      Radiology:  MR and XR images independently visualized and reviewed with patient today in clinic    Recent Results (from the past 744 hour(s))   XR Wrist Right G/E 3 Views    Narrative    XR WRIST RT G/E 3 VW 1/13/2020 4:51 PM     HISTORY: Closed nondisplaced fracture of styloid process of right  ulna, initial encounter      Impression    IMPRESSION: Negative exam.    LISA SONG MD   XR Wrist Right G/E 3 Views    Narrative    XR WRIST RT G/E 3 VW 1/31/2020 3:58 PM     HISTORY: Closed nondisplaced fracture of styloid process of right ulna  with routine healing, subsequent encounter    COMPARISON: 1/13/2020.      Impression    IMPRESSION: Slight deformity of the ulnar styloid region consistent  with known history of fracture in this region. The appearance is  unchanged and there are no new bony abnormalities or significant  degenerative changes.    CHARMAINE MCKEON MD        Assessment:  1. Closed nondisplaced fracture of styloid process of right ulna with routine healing, subsequent encounter    2. Right wrist tendonitis        Plan:  Discussed the assessment with the patient.  Follow up: 2-3 weeks, with Dr Holden  Cast removed today  She feels significantly better today over the site of injury  Presented today due to some irritation in thumb web space from cast material  Can forego visit with Dr Holden next week  Advised restarting OT after next week  Bracing options for work and activity reviewed  Basic ROM HEP reviewed  Could consider additional intervention/injection in the future, as previously discussed with Dr Holden, but agree to defer for now with recent improvement with immobilization  Letter for work updated today  XR images independently visualized and reviewed with patient today in clinic  Advised slow, steady increases in activity  Home handouts provided and supportive care reviewed  All questions were answered today  Contact us with additional questions or concerns  Signs and sx of concern reviewed      Kavin Zavaleta DO, PAOLO  Primary Care Sports Medicine  [facility]               Disclaimer: This note consists of symbols derived from keyboarding, dictation and/or voice recognition software. As a result, there may be errors in the script that have gone undetected. Please consider this when interpreting information found in this chart.

## 2020-01-31 NOTE — LETTER
2020         RE: Pat Dan  [address] [place] [address]        Dear Colleague,    Thank you for referring your patient, Pat Dan, to the [facility] [place]. Please see a copy of my visit note below.    Pat Dan  :  1970  DOS: 2020  MRN: [id number]    Sports Medicine Clinic Visit    PCP: Raissa Kirby    Pat Dan is a 49 year old female who is seen as an AIC patient presenting with irritation or short arm cast.    She was initially seen by Dr. Holden on 19 as an AIC patient. He ordered an MRI of the wrist which showed an acute/subacute nondisplaced fracture of the ulnar styloid. Since last visit with Dr. Holden patient has been wearing a cast. Patient complains that a small lip on the inside of cast has caused pain, numbness and tingling from her thumb rubbing on that spot. She will be going out of town for 3 days and would like the cast to be taken off since it was coming off at her appointment on the  that she had scheduled.     Social History: works at a [employer]    Review of Systems  Musculoskeletal: as above  Remainder of review of systems is negative including constitutional, CV, pulmonary, GI, Skin and Neurologic except as noted in HPI or medical history.    Past Medical History:   Diagnosis Date     Retained placenta without hemorrhage, unspecified as to episode of care     placenta acreta     S/P hysterectomy     retained placenta     Past Surgical History:   Procedure Laterality Date     SURGICAL HISTORY OF -   age 16    laparoscopy     SURGICAL HISTORY OF -   5    D & C, supracervical hysterectomy     Family History   Problem Relation Age of Onset     Genitourinary Problems Maternal Grandmother         kidney disease     Genetic Disorder Other         Cystic fibrosis     Breast Cancer Paternal Aunt 55     Breast Cancer Cousin 42         Objective  /75 (BP Location: Left arm,  "Patient Position: Chair)   Ht 1.702 m (5' 7\")   Wt 84.8 kg (187 lb)   BMI 29.29 kg/m         General: healthy, alert and in no distress      HEENT: no scleral icterus or conjunctival erythema     Skin: no suspicious lesions or rash. No jaundice.     CV: regular rhythm by palpation, 2+ distal pulses, no pedal edema      Resp: normal respiratory effort without conversational dyspnea     Psych: normal mood and affect      Gait: nonantalgic, appropriate coordination and balance     Neuro: normal light touch sensory exam of the extremities. Motor strength as noted below     Right Wrist and Hand exam    Inspection:       No swelling, bruising or deformity right and      Healing laceration over dorsal wrist, 1 cm    Tender:       Very mild over ulnar wrist joint, minimal TTP of the ulnar styloid, improved, mild TTP of the ECU    Non Tender:       Remainder of the Wrist and Hand right    ROM:       Decreased active and passive ROM of the wrist with flexion and extension right, near full and painfree ROM in ulnar and radial deviation and very mild pain with pronation/supination over lateal wrist    Strength:       Motor function intact    Neurovascular:       2+ radial pulses bilaterally with brisk capillary refill and      normal sensation to light touch in the radial, median and ulnar nerve distributions      Radiology:  MR and XR images independently visualized and reviewed with patient today in clinic    Recent Results (from the past 744 hour(s))   XR Wrist Right G/E 3 Views    Narrative    XR WRIST RT G/E 3 VW 1/13/2020 4:51 PM     HISTORY: Closed nondisplaced fracture of styloid process of right  ulna, initial encounter      Impression    IMPRESSION: Negative exam.    LISA SONG MD   XR Wrist Right G/E 3 Views    Narrative    XR WRIST RT G/E 3 VW 1/31/2020 3:58 PM     HISTORY: Closed nondisplaced fracture of styloid process of right ulna  with routine healing, subsequent encounter    COMPARISON: 1/13/2020.      " Impression    IMPRESSION: Slight deformity of the ulnar styloid region consistent  with known history of fracture in this region. The appearance is  unchanged and there are no new bony abnormalities or significant  degenerative changes.    CHARMAINE MCKEON MD       Assessment:  1. Closed nondisplaced fracture of styloid process of right ulna with routine healing, subsequent encounter    2. Right wrist tendonitis        Plan:  Discussed the assessment with the patient.  Follow up: 2-3 weeks, with Dr Holden  Cast removed today  She feels significantly better today over the site of injury  Presented today due to some irritation in thumb web space from cast material  Can forego visit with Dr Holden next week  Advised restarting OT after next week  Bracing options for work and activity reviewed  Basic ROM HEP reviewed  Could consider additional intervention/injection in the future, as previously discussed with Dr Holden, but agree to defer for now with recent improvement with immobilization  Letter for work updated today  XR images independently visualized and reviewed with patient today in clinic  Advised slow, steady increases in activity  Home handouts provided and supportive care reviewed  All questions were answered today  Contact us with additional questions or concerns  Signs and sx of concern reviewed      Kavin Zavaleta DO, CAQ  Primary Care Sports Medicine  [facility]               Disclaimer: This note consists of symbols derived from keyboarding, dictation and/or voice recognition software. As a result, there may be errors in the script that have gone undetected. Please consider this when interpreting information found in this chart.      Again, thank you for allowing me to participate in the care of your patient.        Sincerely,        Kavin Zavaleta DO

## 2020-01-31 NOTE — LETTER
January 31, 2020      RE: Pat Dan  [address] [address]    Pat Dan was seen in clinic today for re-evaluation of an injury, right ulnar styloid fracture, wrist tendinitis and wrist laceration. The employee is ABLE to return to work next scheduled work date. may still do icing if helpful, use of over the counter medication for pain, elevation for soreness. She will plan to use a wrist brace or wrist support at work as needed until the next follow up visit.      Restrictions:  Limit independent lift/carry/push/pull with right upper extremity as needed to avoid pain.  May use as helper for left if completely pain free.  Limit gripping/grasping, no outstretched arm on right, again both as needed.  Limit painful repetitive motion right upper extremity.    If unable to accommodate restrictions due to ongoing symptoms from the injury, then may need to remain off work until recheck.  Any absence from work related to increased pain should be considered medically excused.  Anticipate recheck in approximately 3 weeks.    Kavin Washington, , CAQ  Primary Care Sports Medicine  [facility]

## 2020-03-23 PROBLEM — M25.531 RIGHT WRIST PAIN: Status: RESOLVED | Noted: 2020-01-06 | Resolved: 2020-03-23

## 2020-03-23 PROBLEM — M77.8 RIGHT WRIST TENDONITIS: Status: RESOLVED | Noted: 2020-01-06 | Resolved: 2020-03-23

## 2020-03-23 PROBLEM — S52.614A CLOSED NONDISPLACED FRACTURE OF STYLOID PROCESS OF RIGHT ULNA, INITIAL ENCOUNTER: Status: RESOLVED | Noted: 2020-01-06 | Resolved: 2020-03-23

## 2020-03-23 NOTE — PROGRESS NOTES
Discharge Summary - Hand Therapy    Patient cancelled or no showed for several follow up appointments and did not return to therapy.  Assume all goals were met to patient's satisfaction. D/C from hand therapy.

## 2020-04-27 ENCOUNTER — VIRTUAL VISIT (OUTPATIENT)
Dept: FAMILY MEDICINE | Facility: CLINIC | Age: 50
End: 2020-04-27
Payer: COMMERCIAL

## 2020-04-27 DIAGNOSIS — H93.13 TINNITUS, BILATERAL: ICD-10-CM

## 2020-04-27 DIAGNOSIS — H69.93 DYSFUNCTION OF BOTH EUSTACHIAN TUBES: Primary | ICD-10-CM

## 2020-04-27 PROCEDURE — 99213 OFFICE O/P EST LOW 20 MIN: CPT | Mod: 95 | Performed by: PHYSICIAN ASSISTANT

## 2020-04-27 RX ORDER — PREDNISONE 20 MG/1
TABLET ORAL
Qty: 5 TABLET | Refills: 0 | Status: SHIPPED | OUTPATIENT
Start: 2020-04-27 | End: 2020-08-13

## 2020-04-27 NOTE — PROGRESS NOTES
"Pat Dan is a 49 year old female who is being evaluated via a billable telephone visit.      The patient has been notified of following:     \"This telephone visit will be conducted via a call between you and your physician/provider. We have found that certain health care needs can be provided without the need for a physical exam.  This service lets us provide the care you need with a short phone conversation.  If a prescription is necessary we can send it directly to your pharmacy.  If lab work is needed we can place an order for that and you can then stop by our lab to have the test done at a later time.    Telephone visits are billed at different rates depending on your insurance coverage. During this emergency period, for some insurers they may be billed the same as an in-person visit.  Please reach out to your insurance provider with any questions.    If during the course of the call the physician/provider feels a telephone visit is not appropriate, you will not be charged for this service.\"    Patient has given verbal consent for Telephone visit?  Yes    How would you like to obtain your AVS? E-Mail (inform patient AVS not encrypted) [email or web address]    Subjective     Pat Dan is a 49 year old female who presents to clinic today for the following health issues:    HPI  Ear problem      Duration: x10 days    Description (location/character/radiation): ringing in both ears, high pitch sound, notice it mostly quiet rooms    Intensity:  moderate    Accompanying signs and symptoms: none    History (similar episodes/previous evaluation): None    Precipitating or alleviating factors: None    Therapies tried and outcome: OTC ear drops- ear ringing relief      Denies decreased hearing, but one of her children has been difficult to hear  Listening to the tv at a higher volume   No dizziness or ear pain  Some sinus congestion when she's working out       Patient Active Problem List   Diagnosis "     Shoulder pain     Condylomata acuminata in female     Past Surgical History:   Procedure Laterality Date     SURGICAL HISTORY OF -   age 16    laparoscopy     SURGICAL HISTORY OF -   12/16/05    D & C, supracervical hysterectomy       Social History     Tobacco Use     Smoking status: Never Smoker     Smokeless tobacco: Never Used   Substance Use Topics     Alcohol use: No     Family History   Problem Relation Age of Onset     Genitourinary Problems Maternal Grandmother         kidney disease     Genetic Disorder Other         Cystic fibrosis     Breast Cancer Paternal Aunt 55     Breast Cancer Cousin 42           Reviewed and updated as needed this visit by Provider         Review of Systems   ROS COMP: Constitutional, HEENT systems are negative, except as otherwise noted.       Objective   Reported vitals:  There were no vitals taken for this visit.   healthy, alert and no distress  PSYCH: Alert and oriented times 3; coherent speech, normal   rate and volume, able to articulate logical thoughts, able   to abstract reason, no tangential thoughts, no hallucinations   or delusions  Her affect is normal and pleasant  RESP: No cough, no audible wheezing, able to talk in full sentences  Remainder of exam unable to be completed due to telephone visits        Assessment/Plan:  1. Dysfunction of both eustachian tubes    2. Tinnitus, bilateral         1,2) ETD versus acquired tinnitus versus cerumen impaction. Will treat her for ETD with prednisone and Sudafed. If no improvements after 2 weeks will refer her to ENT.      Return in about 2 weeks (around 5/11/2020), or if symptoms worsen or fail to improve.      Phone call duration:  9 minutes    Raissa Kirby PA-C

## 2020-05-14 ENCOUNTER — TELEPHONE (OUTPATIENT)
Dept: FAMILY MEDICINE | Facility: CLINIC | Age: 50
End: 2020-05-14

## 2020-05-14 DIAGNOSIS — H93.13 TINNITUS, BILATERAL: Primary | ICD-10-CM

## 2020-05-14 DIAGNOSIS — H69.93 DYSFUNCTION OF BOTH EUSTACHIAN TUBES: ICD-10-CM

## 2020-05-14 NOTE — TELEPHONE ENCOUNTER
4/27/2020 virtual visit    Assessment/Plan:  1. Dysfunction of both eustachian tubes    2. Tinnitus, bilateral          1,2) ETD versus acquired tinnitus versus cerumen impaction. Will treat her for ETD with prednisone and Sudafed. If no improvements after 2 weeks will refer her to ENT.      ENT referral pended for approval

## 2020-05-14 NOTE — TELEPHONE ENCOUNTER
left message on patients voicemail to return call. Does she want referral info sent through her my chart?

## 2020-05-14 NOTE — TELEPHONE ENCOUNTER
Patient has tried medication for ringing in her ears. It has not helped. Can she get a referral for a specialist? Ok to leave a detailed message.

## 2020-05-20 NOTE — TELEPHONE ENCOUNTER
Left message on patients voicemail referral for ENT has been placed. Phone number given to schedule.

## 2020-06-29 ENCOUNTER — TELEPHONE (OUTPATIENT)
Dept: OTOLARYNGOLOGY | Facility: CLINIC | Age: 50
End: 2020-06-29

## 2020-07-15 NOTE — TELEPHONE ENCOUNTER
FUTURE VISIT INFORMATION      FUTURE VISIT INFORMATION:    Date: 8/13/2020    Time: 4PM    [place] [facility] [place]  REFERRAL INFORMATION:    Referring provider:  Raissa Kirby PA-C    Referring providers clinic:   [facility]    Reason for visit/diagnosis  Tinnitus, bilateral; dysfunction of both eustachian tubes. Referred per PCP, no outside recs. Sched per pt.    RECORDS REQUESTED FROM:       Clinic name Comments Records Status Imaging Status   [facility] 4/27/2020 note from Raissa Kirby PA-C Epic

## 2020-07-18 ENCOUNTER — DOCUMENTATION ONLY (OUTPATIENT)
Dept: CARE COORDINATION | Facility: CLINIC | Age: 50
End: 2020-07-18

## 2020-08-13 ENCOUNTER — OFFICE VISIT (OUTPATIENT)
Dept: OTOLARYNGOLOGY | Facility: CLINIC | Age: 50
End: 2020-08-13
Attending: PHYSICIAN ASSISTANT
Payer: COMMERCIAL

## 2020-08-13 ENCOUNTER — OFFICE VISIT (OUTPATIENT)
Dept: AUDIOLOGY | Facility: CLINIC | Age: 50
End: 2020-08-13
Attending: PHYSICIAN ASSISTANT
Payer: COMMERCIAL

## 2020-08-13 ENCOUNTER — PRE VISIT (OUTPATIENT)
Dept: OTOLARYNGOLOGY | Facility: CLINIC | Age: 50
End: 2020-08-13

## 2020-08-13 DIAGNOSIS — H90.3 SENSORINEURAL HEARING LOSS (SNHL) OF BOTH EARS: Primary | ICD-10-CM

## 2020-08-13 DIAGNOSIS — H90.3 SENSORINEURAL HEARING LOSS, BILATERAL: Primary | ICD-10-CM

## 2020-08-13 DIAGNOSIS — H93.13 TINNITUS, BILATERAL: ICD-10-CM

## 2020-08-13 DIAGNOSIS — H93.13 TINNITUS OF BOTH EARS: ICD-10-CM

## 2020-08-13 ASSESSMENT — PAIN SCALES - GENERAL: PAINLEVEL: NO PAIN (0)

## 2020-08-13 NOTE — PROGRESS NOTES
The patient presents with a history of bilateral hearing loss and tinnitus. She reports that the tinnitus is only significantly bothersome for her at night. During the day, she is not bothered by the tinnitus. The patient denies sinusitis, rhinitis, facial pain, nasal obstruction or purulent nasal discharge. The patient denies chronic or recurrent tonsillitis, chronic or recurrent pharyngitis. The patient denies otalgia, otorrhea, eustachian tube dysfunction, ear infections or dizziness.     Her Audiogram and Tympanogram demonstrates bilateral moderate sensorineural hearing loss with 96% word recognition in the right ear and 92% word recognition in the left ear. Her tympanograms are normal.     This patient is seen in consultation at the request of Raissa Kirby PA-C.     All other systems were reviewed and they are either negative or they are not directly pertinent to this Otolaryngology examination.      Past Medical History:    Past Medical History:   Diagnosis Date     Retained placenta without hemorrhage, unspecified as to episode of care 2005    placenta acreta     S/P hysterectomy     retained placenta       Past Surgical History:    Past Surgical History:   Procedure Laterality Date     SURGICAL HISTORY OF -   age 16    laparoscopy     SURGICAL HISTORY OF -   12/16/05    D & C, supracervical hysterectomy       Medications:      Current Outpatient Medications:      podofilox (CONDYLOX) 0.5 % external solution, Apply topically 2 times daily X 3 days, off treatment x 4 days. Cycle may be repeated up to 4 cycles., Disp: 3.2 mL, Rfl: 1     predniSONE (DELTASONE) 20 MG tablet, Take 1 tab daily x 3 days, then 1/2 tab daily x 4 days, Disp: 5 tablet, Rfl: 0    Allergies:    Patient has no known allergies.    Physical Examination:    The patient is a well developed, well nourished female in no apparent distress.  She is normocephalic, atraumatic with pupils equally round and reactive to light.    Ear Examination: Ear  canals clear, tympanic membranes and middle ear spaces normal  Neurological Examination: Facial nerve function intact and symmetric  Integumentary Examination: No lesions on the skin of the head and neck    Assessment and Plan:    The patient presents with a history of bilateral sensorineural hearing loss and tinnitus. Based upon her Audiogram and Tympanogram, she will consider hearing aids. She will avoid caffeine and use background sound at night. She will be seen again in four weeks for a virtual visit and if her difficulty with tinnitus is not improved, she will consider the tinnitus support program and hearing aids.     CC: Raissa Kirby

## 2020-08-13 NOTE — LETTER
8/13/2020       RE: Pat Dan  [address]  [place] [address]     Dear Colleague,    Thank you for referring your patient, Pat Dan, to the [facility] at [facility]. Please see a copy of my visit note below.    The patient presents with a history of bilateral hearing loss and tinnitus. She reports that the tinnitus is only significantly bothersome for her at night. During the day, she is not bothered by the tinnitus. The patient denies sinusitis, rhinitis, facial pain, nasal obstruction or purulent nasal discharge. The patient denies chronic or recurrent tonsillitis, chronic or recurrent pharyngitis. The patient denies otalgia, otorrhea, eustachian tube dysfunction, ear infections or dizziness.     Her Audiogram and Tympanogram demonstrates bilateral moderate sensorineural hearing loss with 96% word recognition in the right ear and 92% word recognition in the left ear. Her tympanograms are normal.     This patient is seen in consultation at the request of Raissa Kirby PA-C.     All other systems were reviewed and they are either negative or they are not directly pertinent to this Otolaryngology examination.      Past Medical History:    Past Medical History:   Diagnosis Date     Retained placenta without hemorrhage, unspecified as to episode of care 2005    placenta acreta     S/P hysterectomy     retained placenta       Past Surgical History:    Past Surgical History:   Procedure Laterality Date     SURGICAL HISTORY OF -   age 16    laparoscopy     SURGICAL HISTORY OF -   12/16/05    D & C, supracervical hysterectomy       Medications:      Current Outpatient Medications:      podofilox (CONDYLOX) 0.5 % external solution, Apply topically 2 times daily X 3 days, off treatment x 4 days. Cycle may be repeated up to 4 cycles., Disp: 3.2 mL, Rfl: 1     predniSONE (DELTASONE) 20 MG tablet, Take 1 tab daily x 3 days, then 1/2 tab daily x 4  days, Disp: 5 tablet, Rfl: 0    Allergies:    Patient has no known allergies.    Physical Examination:    The patient is a well developed, well nourished female in no apparent distress.  She is normocephalic, atraumatic with pupils equally round and reactive to light.    Ear Examination: Ear canals clear, tympanic membranes and middle ear spaces normal  Neurological Examination: Facial nerve function intact and symmetric  Integumentary Examination: No lesions on the skin of the head and neck    Assessment and Plan:    The patient presents with a history of bilateral sensorineural hearing loss and tinnitus. Based upon her Audiogram and Tympanogram, she will consider hearing aids. She will avoid caffeine and use background sound at night. She will be seen again in four weeks for a virtual visit and if her difficulty with tinnitus is not improved, she will consider the tinnitus support program and hearing aids.     CC: Raissa Kirby      Again, thank you for allowing me to participate in the care of your patient.      Sincerely,    Eleno Hankins MD

## 2020-08-13 NOTE — PATIENT INSTRUCTIONS
The patient presents with a history of bilateral sensorineural hearing loss and tinnitus. Based upon her Audiogram and Tympanogram, she will consider hearing aids. She will avoid caffeine and use background sound at night. She will be seen again in four weeks for a virtual visit and if her difficulty with tinnitus is not improved, she will consider the tinnitus support program and hearing aids.     1. You were seen in the ENT Clinic today by Dr. Hankins .  If you have any questions or concerns after your appointment, please call   - Option 1: ENT Clinic: [phone]   - Option 2: Carmen (Dr. Hankins's  Nurse): [phone]     2.   Plan to return to clinic in 1 month    3. Reduce Caffeine    Cintia Apple LPN  [facility] - Otolaryngology

## 2020-10-19 DIAGNOSIS — Z20.822 EXPOSURE TO COVID-19 VIRUS: Primary | ICD-10-CM

## 2020-10-20 ENCOUNTER — TRANSFERRED RECORDS (OUTPATIENT)
Dept: HEALTH INFORMATION MANAGEMENT | Facility: CLINIC | Age: 50
End: 2020-10-20

## 2020-11-18 ENCOUNTER — TELEPHONE (OUTPATIENT)
Dept: FAMILY MEDICINE | Facility: CLINIC | Age: 50
End: 2020-11-18

## 2020-11-18 NOTE — TELEPHONE ENCOUNTER
Reason for call:  Patient reporting a symptom    Symptom or request: uti    Duration (how long have symptoms been present): 10 days    Have you been treated for this before? Yes    Additional comments: patient was seen at [facility] and was prescribed a 5 day twice a day antibiotic, she was unsure of the name. States that her symptoms are back and she doesn't think it ever cleared up.       Phone Number patient can be reached at:  Home number on file [phone] (home)    Best Time:  Any     Can we leave a detailed message on this number:  YES    Call taken on 11/18/2020 at 2:46 PM by Amee Mejias

## 2020-11-18 NOTE — TELEPHONE ENCOUNTER
Pat states that she finished a 5 day course of BID Nitrofurantoin on 11/13/20. She reports that she only improved slightly.     Currently, she is uncomfortable with urinary frequency and urgency. Pain with urination. She is taking Azo.   No fever or flank pain. Urine appears cloudy. Patient is not sure if she has blood in her urine, she believes the Azo may be causing some discoloration.     Patient declined a face to face visit however scheduled a virtual/video visit with Vijay Hand PA-C tomorrow.     Pat states that she will try to get a copy of her lab results from the [facility] visit from 11/9/20 & will scan into her My Chart if able.     Tari Cespedes RN BSN

## 2020-11-18 NOTE — PROGRESS NOTES
"Pat Dan is a 50 year old female who is being evaluated via a billable video visit.      The patient has been notified of following:     \"This video visit will be conducted via a call between you and your physician/provider. We have found that certain health care needs can be provided without the need for an in-person physical exam.  This service lets us provide the care you need with a video conversation.  If a prescription is necessary we can send it directly to your pharmacy.  If lab work is needed we can place an order for that and you can then stop by our lab to have the test done at a later time.    Video visits are billed at different rates depending on your insurance coverage.  Please reach out to your insurance provider with any questions.    If during the course of the call the physician/provider feels a video visit is not appropriate, you will not be charged for this service.\"    Patient has given verbal consent for Video visit? Yes  How would you like to obtain your AVS? MyChart  If you are dropped from the video visit, the video invite should be resent to: Text to cell phone: [phone]  Will anyone else be joining your video visit? No    Subjective     Pat Dan is a 50 year old female who presents today via video visit for the following health issues:    HPI     Genitourinary - Female  Onset/Duration:  Pt was on Rx for UTI but symptoms have not resolved.   Description:   Painful urination (Dysuria): YES           Frequency: YES  Blood in urine (Hematuria): YES  Delay in urine (Hesitency): no  Intensity: moderate  Progression of Symptoms:  worsening and constant  Accompanying Signs & Symptoms:  Fever/chills: no  Flank pain: no  Nausea and vomiting: YES  Vaginal symptoms: none  Abdominal/Pelvic Pain: YES  History:   History of frequent UTI s: no  History of kidney stones: no  Sexually Active: no  Possibility of pregnancy: No      Was seen at [facility]. She was " symptomatic. Treated with a 5 day course of macrobid. Some slight improvement. Azo helps. No vaginal discharge . Noting dysuria/urinary freq/urgency. No fevers. No profound n/v/d/c. No vaginal itching.     Video Start Time: 1:44 PM        Review of Systems   Constitutional, HEENT, cardiovascular, pulmonary, GI, , musculoskeletal, neuro, skin, endocrine and psych systems are negative, except as otherwise noted.      Objective           Vitals:  No vitals were obtained today due to virtual visit.    Physical Exam     GENERAL: Healthy, alert and no distress  EYES: Eyes grossly normal to inspection.  No discharge or erythema, or obvious scleral/conjunctival abnormalities.  RESP: No audible wheeze, cough, or visible cyanosis.  No visible retractions or increased work of breathing.    SKIN: Visible skin clear. No significant rash, abnormal pigmentation or lesions.  NEURO: Cranial nerves grossly intact.  Mentation and speech appropriate for age.  PSYCH: Mentation appears normal, affect normal/bright, judgement and insight intact, normal speech and appearance well-groomed.              Pat was seen today for uti.    Diagnoses and all orders for this visit:    Acute cystitis without hematuria  -     sulfamethoxazole-trimethoprim (BACTRIM DS) 800-160 MG tablet; Take 1 tablet by mouth 2 times daily for 7 days      Advised supportive and symptomatic treatment.  Follow up with Provider - if condition persists or worsens.         Video-Visit Details    Type of service:  Video Visit    Video End Time:1:54 PM    Originating Location (pt. Location): Home    Distant Location (provider location):  [facility]     Platform used for Video Visit: Chuy

## 2020-11-19 ENCOUNTER — VIRTUAL VISIT (OUTPATIENT)
Dept: FAMILY MEDICINE | Facility: CLINIC | Age: 50
End: 2020-11-19
Payer: COMMERCIAL

## 2020-11-19 DIAGNOSIS — N30.00 ACUTE CYSTITIS WITHOUT HEMATURIA: Primary | ICD-10-CM

## 2020-11-19 PROCEDURE — 99213 OFFICE O/P EST LOW 20 MIN: CPT | Mod: 95 | Performed by: PHYSICIAN ASSISTANT

## 2020-11-19 RX ORDER — SULFAMETHOXAZOLE/TRIMETHOPRIM 800-160 MG
1 TABLET ORAL 2 TIMES DAILY
Qty: 14 TABLET | Refills: 0 | Status: SHIPPED | OUTPATIENT
Start: 2020-11-19 | End: 2020-11-26

## 2020-12-02 ENCOUNTER — TELEPHONE (OUTPATIENT)
Dept: FAMILY MEDICINE | Facility: CLINIC | Age: 50
End: 2020-12-02

## 2020-12-02 NOTE — TELEPHONE ENCOUNTER
Reason for Call:  Other call back    Detailed comments: patient is calling stating has reoccuring UTI and would like to discuss treatment and meds. Please call to discuss. Thank you.    Phone Number Patient can be reached at: Home number on file [phone] (home)    Best Time:     Can we leave a detailed message on this number? YES    Call taken on 12/2/2020 at 9:53 AM by Alisa Ward

## 2020-12-02 NOTE — TELEPHONE ENCOUNTER
Spoke with pt, she was treated twice for a uti. [facility] and was treated with 5 day course of Macrobid. Uti improved slightly but never went away. Had a VV with Vijay saenz on 11/19/20 and was treated with sulfamethoxazole-trimethoprim (BACTRIM DS) 800-160 MG tablet for 7 days. Pat said it gets better but the pressure is still there and constantly feels she need to go to the bathroom. Pt is in [place] right now and would like a new prescription sent in for UTI.   Pharmacy pended  RN Please notify pt when script is sent, ok to leave a detailed message.  [phone]

## 2021-01-14 ENCOUNTER — HEALTH MAINTENANCE LETTER (OUTPATIENT)
Age: 51
End: 2021-01-14

## 2021-02-12 ENCOUNTER — OFFICE VISIT (OUTPATIENT)
Dept: FAMILY MEDICINE | Facility: CLINIC | Age: 51
End: 2021-02-12
Payer: COMMERCIAL

## 2021-02-12 VITALS
DIASTOLIC BLOOD PRESSURE: 70 MMHG | BODY MASS INDEX: 28.88 KG/M2 | WEIGHT: 184 LBS | SYSTOLIC BLOOD PRESSURE: 108 MMHG | TEMPERATURE: 97.5 F | HEART RATE: 87 BPM | OXYGEN SATURATION: 98 % | HEIGHT: 67 IN

## 2021-02-12 DIAGNOSIS — Z13.29 SCREENING FOR THYROID DISORDER: ICD-10-CM

## 2021-02-12 DIAGNOSIS — Z86.2 HISTORY OF ANEMIA: ICD-10-CM

## 2021-02-12 DIAGNOSIS — R53.83 FATIGUE, UNSPECIFIED TYPE: Primary | ICD-10-CM

## 2021-02-12 LAB
BASOPHILS # BLD AUTO: 0.1 10E9/L (ref 0–0.2)
BASOPHILS NFR BLD AUTO: 1.3 %
CRP SERPL-MCNC: 7.2 MG/L (ref 0–8)
DIFFERENTIAL METHOD BLD: NORMAL
EOSINOPHIL # BLD AUTO: 0.1 10E9/L (ref 0–0.7)
EOSINOPHIL NFR BLD AUTO: 1.5 %
ERYTHROCYTE [DISTWIDTH] IN BLOOD BY AUTOMATED COUNT: 13 % (ref 10–15)
ERYTHROCYTE [SEDIMENTATION RATE] IN BLOOD BY WESTERGREN METHOD: 10 MM/H (ref 0–30)
ESTRADIOL SERPL-MCNC: <11 PG/ML
FSH SERPL-ACNC: 62.6 IU/L
HCT VFR BLD AUTO: 38.4 % (ref 35–47)
HGB BLD-MCNC: 12.3 G/DL (ref 11.7–15.7)
LYMPHOCYTES # BLD AUTO: 1.3 10E9/L (ref 0.8–5.3)
LYMPHOCYTES NFR BLD AUTO: 27 %
MCH RBC QN AUTO: 29.9 PG (ref 26.5–33)
MCHC RBC AUTO-ENTMCNC: 32 G/DL (ref 31.5–36.5)
MCV RBC AUTO: 93 FL (ref 78–100)
MONOCYTES # BLD AUTO: 0.4 10E9/L (ref 0–1.3)
MONOCYTES NFR BLD AUTO: 9.3 %
NEUTROPHILS # BLD AUTO: 2.9 10E9/L (ref 1.6–8.3)
NEUTROPHILS NFR BLD AUTO: 60.9 %
PLATELET # BLD AUTO: 255 10E9/L (ref 150–450)
RBC # BLD AUTO: 4.12 10E12/L (ref 3.8–5.2)
VIT B12 SERPL-MCNC: 580 PG/ML (ref 193–986)
WBC # BLD AUTO: 4.7 10E9/L (ref 4–11)

## 2021-02-12 PROCEDURE — 99214 OFFICE O/P EST MOD 30 MIN: CPT | Performed by: NURSE PRACTITIONER

## 2021-02-12 PROCEDURE — 83540 ASSAY OF IRON: CPT | Performed by: NURSE PRACTITIONER

## 2021-02-12 PROCEDURE — 99000 SPECIMEN HANDLING OFFICE-LAB: CPT | Performed by: NURSE PRACTITIONER

## 2021-02-12 PROCEDURE — 85652 RBC SED RATE AUTOMATED: CPT | Performed by: NURSE PRACTITIONER

## 2021-02-12 PROCEDURE — 80050 GENERAL HEALTH PANEL: CPT | Performed by: NURSE PRACTITIONER

## 2021-02-12 PROCEDURE — 86140 C-REACTIVE PROTEIN: CPT | Performed by: NURSE PRACTITIONER

## 2021-02-12 PROCEDURE — 82670 ASSAY OF TOTAL ESTRADIOL: CPT | Performed by: NURSE PRACTITIONER

## 2021-02-12 PROCEDURE — 84403 ASSAY OF TOTAL TESTOSTERONE: CPT | Mod: 90 | Performed by: NURSE PRACTITIONER

## 2021-02-12 PROCEDURE — 82306 VITAMIN D 25 HYDROXY: CPT | Performed by: NURSE PRACTITIONER

## 2021-02-12 PROCEDURE — 82607 VITAMIN B-12: CPT | Performed by: NURSE PRACTITIONER

## 2021-02-12 PROCEDURE — 83550 IRON BINDING TEST: CPT | Performed by: NURSE PRACTITIONER

## 2021-02-12 PROCEDURE — 36415 COLL VENOUS BLD VENIPUNCTURE: CPT | Performed by: NURSE PRACTITIONER

## 2021-02-12 PROCEDURE — 82728 ASSAY OF FERRITIN: CPT | Performed by: NURSE PRACTITIONER

## 2021-02-12 PROCEDURE — 83001 ASSAY OF GONADOTROPIN (FSH): CPT | Performed by: NURSE PRACTITIONER

## 2021-02-12 ASSESSMENT — MIFFLIN-ST. JEOR: SCORE: 1487.25

## 2021-02-12 ASSESSMENT — PAIN SCALES - GENERAL: PAINLEVEL: NO PAIN (0)

## 2021-02-12 NOTE — PROGRESS NOTES
Assessment & Plan     Fatigue, unspecified type  Will check labs today to further evaluate. She should continue to work with dietician and get regular exercise   - CRP, inflammation  - ESR: Erythrocyte sedimentation rate  - Comprehensive metabolic panel (BMP + Alb, Alk Phos, ALT, AST, Total. Bili, TP)  - Vitamin D Deficiency  - Vitamin B12  - Estradiol  - Testosterone, total  - Follicle stimulating hormone    BMI 28.0-28.9,adult  See above  - CRP, inflammation  - ESR: Erythrocyte sedimentation rate  - Comprehensive metabolic panel (BMP + Alb, Alk Phos, ALT, AST, Total. Bili, TP)  - Estradiol  - Testosterone, total  - Follicle stimulating hormone    Screening for thyroid disorder  - TSH with free T4 reflex    History of anemia  - CBC with platelets and differential  - Iron and iron binding capacity  - Ferritin      Return in about 4 weeks (around 3/12/2021) for Physical Exam.    Crystal Llamas NP  [facility]    Trish Stewart is a 50 year old who presents for the following health issues     HPI       Concern - weight problem  Onset: 1 month  Description: Patient was on prednisone for tinnitus which resulted in a 15lb weight gain, she also reports fatigue and is struggling to lose weight.  Intensity: moderate  Progression of Symptoms:  worsening  Therapies tried and outcome: she sees a nutritionist once every 3 weeks.     She is going to [employer] to exercise daily and goes to [employer] to lift weights 3 days per week (due to the classes being shortened at [employer] with the pandemic). She has done the whole 30 diet In the past.     She states today she has had greek yogurt, almonds, and waffle with peanut butter to eat. Yesterday she had greek yogurt, almonds, vanilla sara with a scoop of collagen, devonte rice and chicken and a zamudio cheeseburger (because she will be starting the transformation challenge on Sunday for the next 8 weeks).     She has noticed some low grade  "headaches at times. She tries to work through it and increase her water intake. She has cut out caffeine from her diet. She has a history of anemia, she takes iron supplements.     She received the covid19 vaccine on Monday and has been more tired since then.     She states she is being seen by [facility] in [place] for tinnitus.    She reports having a hysterectomy about 15 years ago but still has her ovaries. She has continued to have pap smears since then.     She denies family history of thyroid disease or diabetes. She questions if her hormone labs could be off. Denies alcohol intake and denies smoking.       Review of Systems   Constitutional, cardiovascular, pulmonary, gi and gu systems are negative, except poor weight loss, fatigue, headache      Objective    /70 (BP Location: Right arm, Patient Position: Sitting, Cuff Size: Adult Regular)   Pulse 87   Temp 97.5  F (36.4  C) (Oral)   Ht 1.702 m (5' 7\")   Wt 83.5 kg (184 lb)   SpO2 98%   BMI 28.82 kg/m    Body mass index is 28.82 kg/m .  Physical Exam   GENERAL: healthy, alert and no distress  EYES: Eyes grossly normal to inspection, PERRL and conjunctivae and sclerae normal  HENT: ear canals and TM's normal, nose and mouth without ulcers or lesions  NECK: no adenopathy, no asymmetry, masses, or scars and thyroid normal to palpation  RESP: lungs clear to auscultation - no rales, rhonchi or wheezes  CV: regular rate and rhythm, normal S1 S2, no S3 or S4, no murmur, click or rub, no peripheral edema   ABDOMEN: soft, nontender, no hepatosplenomegaly, no masses and bowel sounds normal  MS: no gross musculoskeletal defects noted, no edema  SKIN: no suspicious lesions or rashes  NEURO: Normal strength and tone, mentation intact and speech normal  PSYCH: mentation appears normal, affect normal/bright      "

## 2021-02-13 LAB
ALBUMIN SERPL-MCNC: 3.2 G/DL (ref 3.4–5)
ALP SERPL-CCNC: 96 U/L (ref 40–150)
ALT SERPL W P-5'-P-CCNC: 110 U/L (ref 0–50)
ANION GAP SERPL CALCULATED.3IONS-SCNC: 3 MMOL/L (ref 3–14)
AST SERPL W P-5'-P-CCNC: 69 U/L (ref 0–45)
BILIRUB SERPL-MCNC: 0.3 MG/DL (ref 0.2–1.3)
BUN SERPL-MCNC: 23 MG/DL (ref 7–30)
CALCIUM SERPL-MCNC: 9.5 MG/DL (ref 8.5–10.1)
CHLORIDE SERPL-SCNC: 111 MMOL/L (ref 94–109)
CO2 SERPL-SCNC: 27 MMOL/L (ref 20–32)
CREAT SERPL-MCNC: 0.75 MG/DL (ref 0.52–1.04)
DEPRECATED CALCIDIOL+CALCIFEROL SERPL-MC: 27 UG/L (ref 20–75)
FERRITIN SERPL-MCNC: 33 NG/ML (ref 8–252)
GFR SERPL CREATININE-BSD FRML MDRD: >90 ML/MIN/{1.73_M2}
GLUCOSE SERPL-MCNC: 89 MG/DL (ref 70–99)
IRON SATN MFR SERPL: 8 % (ref 15–46)
IRON SERPL-MCNC: 29 UG/DL (ref 35–180)
POTASSIUM SERPL-SCNC: 4 MMOL/L (ref 3.4–5.3)
PROT SERPL-MCNC: 6.3 G/DL (ref 6.8–8.8)
SODIUM SERPL-SCNC: 141 MMOL/L (ref 133–144)
TIBC SERPL-MCNC: 342 UG/DL (ref 240–430)
TSH SERPL DL<=0.005 MIU/L-ACNC: 1.52 MU/L (ref 0.4–4)

## 2021-02-16 LAB — TESTOST SERPL-MCNC: 7 NG/DL (ref 8–60)

## 2021-02-21 ENCOUNTER — TELEPHONE (OUTPATIENT)
Dept: FAMILY MEDICINE | Facility: CLINIC | Age: 51
End: 2021-02-21

## 2021-02-21 DIAGNOSIS — R79.89 LOW TESTOSTERONE LEVEL IN FEMALE: ICD-10-CM

## 2021-02-21 DIAGNOSIS — R79.89 ELEVATED LFTS: Primary | ICD-10-CM

## 2021-02-22 NOTE — TELEPHONE ENCOUNTER
Pat called back. Was unable to transfer the call to the care team to address.    [phone] call back     Lillie Zambrano

## 2021-02-24 NOTE — TELEPHONE ENCOUNTER
Liver ultrasound was ordered due to elevated liver function tests. Please have her review result note in my chart, If unable to access my chart then review results with pt.

## 2021-02-24 NOTE — TELEPHONE ENCOUNTER
Called and reviewed Crystal Llamas NP message below with pt. She verbalized an understanding and is scheduled for the ultrasound this Friday.    Kesha Lindo RN

## 2021-02-24 NOTE — TELEPHONE ENCOUNTER
Patient would like to know the reasoning for the US?  imaging phone number was also given to the patient.

## 2021-02-26 ENCOUNTER — ANCILLARY PROCEDURE (OUTPATIENT)
Dept: ULTRASOUND IMAGING | Facility: CLINIC | Age: 51
End: 2021-02-26
Attending: NURSE PRACTITIONER
Payer: COMMERCIAL

## 2021-02-26 ENCOUNTER — OFFICE VISIT (OUTPATIENT)
Dept: FAMILY MEDICINE | Facility: CLINIC | Age: 51
End: 2021-02-26
Payer: COMMERCIAL

## 2021-02-26 VITALS
BODY MASS INDEX: 28.72 KG/M2 | HEIGHT: 67 IN | SYSTOLIC BLOOD PRESSURE: 118 MMHG | DIASTOLIC BLOOD PRESSURE: 80 MMHG | WEIGHT: 183 LBS

## 2021-02-26 DIAGNOSIS — E61.1 LOW IRON: ICD-10-CM

## 2021-02-26 DIAGNOSIS — Z12.11 SCREEN FOR COLON CANCER: ICD-10-CM

## 2021-02-26 DIAGNOSIS — Z12.31 ENCOUNTER FOR SCREENING MAMMOGRAM FOR BREAST CANCER: ICD-10-CM

## 2021-02-26 DIAGNOSIS — R79.89 ELEVATED LFTS: ICD-10-CM

## 2021-02-26 DIAGNOSIS — H93.13 TINNITUS, BILATERAL: ICD-10-CM

## 2021-02-26 DIAGNOSIS — L98.9 SKIN LESION: ICD-10-CM

## 2021-02-26 DIAGNOSIS — Z13.220 SCREENING FOR HYPERLIPIDEMIA: ICD-10-CM

## 2021-02-26 DIAGNOSIS — Z00.00 ROUTINE GENERAL MEDICAL EXAMINATION AT A HEALTH CARE FACILITY: Primary | ICD-10-CM

## 2021-02-26 PROCEDURE — 99213 OFFICE O/P EST LOW 20 MIN: CPT | Mod: 25 | Performed by: NURSE PRACTITIONER

## 2021-02-26 PROCEDURE — 99396 PREV VISIT EST AGE 40-64: CPT | Performed by: NURSE PRACTITIONER

## 2021-02-26 ASSESSMENT — ENCOUNTER SYMPTOMS
NERVOUS/ANXIOUS: 0
ARTHRALGIAS: 0
PALPITATIONS: 0
CHILLS: 0
PARESTHESIAS: 0
ABDOMINAL PAIN: 0
SHORTNESS OF BREATH: 0
SORE THROAT: 0
FEVER: 0
BREAST MASS: 0
HEADACHES: 0
EYE PAIN: 0
CONSTIPATION: 0
MYALGIAS: 0
HEMATOCHEZIA: 0
WEAKNESS: 0
DIARRHEA: 0
HEMATURIA: 0
NAUSEA: 0
DIZZINESS: 0
FREQUENCY: 0
JOINT SWELLING: 0
DYSURIA: 0
COUGH: 0
HEARTBURN: 0

## 2021-02-26 ASSESSMENT — MIFFLIN-ST. JEOR: SCORE: 1482.71

## 2021-02-26 NOTE — PATIENT INSTRUCTIONS
Preventive Health Recommendations  Female Ages 50 - 64    Yearly exam: See your health care provider every year in order to  o Review health changes.   o Discuss preventive care.    o Review your medicines if your doctor has prescribed any.      Get a Pap test every three years (unless you have an abnormal result and your provider advises testing more often).    If you get Pap tests with HPV test, you only need to test every 5 years, unless you have an abnormal result.     You do not need a Pap test if your uterus was removed (hysterectomy) and you have not had cancer.    You should be tested each year for STDs (sexually transmitted diseases) if you're at risk.     Have a mammogram every 1 to 2 years.    Have a colonoscopy at age 50, or have a yearly FIT test (stool test). These exams screen for colon cancer.      Have a cholesterol test every 5 years, or more often if advised.    Have a diabetes test (fasting glucose) every three years. If you are at risk for diabetes, you should have this test more often.     If you are at risk for osteoporosis (brittle bone disease), think about having a bone density scan (DEXA).    Shots: Get a flu shot each year. Get a tetanus shot every 10 years.    Nutrition:     Eat at least 5 servings of fruits and vegetables each day.    Eat whole-grain bread, whole-wheat pasta and brown rice instead of white grains and rice.    Get adequate Calcium and Vitamin D.     Lifestyle    Exercise at least 150 minutes a week (30 minutes a day, 5 days a week). This will help you control your weight and prevent disease.    Limit alcohol to one drink per day.    No smoking.     Wear sunscreen to prevent skin cancer.     See your dentist every six months for an exam and cleaning.    See your eye doctor every 1 to 2 years.      Recommend calling [phone]  to schedule appointment with endocrinology regarding low testosterone.     Patient Education     What Are Gallstones?    The gallbladder stores  bile, a fluid made by the liver. Bile helps digest fats in the foods you eat. Gallstones form when certain substances in the bile crystallize and become solid. In some cases, the stones don t cause any symptoms. In others, they irritate the walls of the gallbladder. More serious problems can occur if stones move into nearby ducts (such as the common bile duct) and cause blockages. This can block the flow of bile and lead to pain, nausea, and infection.   Common symptoms  Gallbladder problems can cause painful attacks, often after a meal. Some people have only 1 attack. Others have many. Common symptoms include:     Severe, steady pain or aching in the upper right or middle belly (abdomen), back, or right shoulder blade. This can last from 30 minutes to several hours.    A dull ache under the ribs or breastbone    Upset stomach (nausea) or vomiting    A buildup of too much bile in the blood. This causes yellowing of the skin and eyes, dark urine, and itching (jaundice). Call your healthcare provider right away if this occurs.  Risk factors for gallstones  You are more at risk for gallstones if you:    Are a woman    Are obese    Have a family history    Are older (the risk goes up with age)    Have a history of very fast (rapid) weight loss    Have certain intestinal diseases, such as Crohn s disease    Have certain blood diseases, such as sickle cell anemia    Have a family background that includes Native Americans or Danish Americans  Diagnosis  Ultrasound is often used to look at the gallbladder and measure the size and exact location of gallstone.   Blood tests are used to measure liver enzymes and bilirubin. Both of these may be high if the gallstones are blocking bile flow or irritating the liver.   Treating gallstones  If your stones are not causing symptoms, you may choose to delay treatment. But if you ve had 1 or more painful attacks, your healthcare provider will likely advise removing your gallbladder.  This prevents more stones from forming and causing attacks. It also helps prevent problems, such as stones passing into the ducts and causing infection or pancreatitis. After the gallbladder is removed, your liver will still make bile to aid digestion.   If you're pregnant  Hormone changes during pregnancy can make bile more likely to form stones. If your gallbladder needs to be removed, your healthcare provider will talk with you about the timing for surgery. In some cases, it can be delayed until after childbirth. In others, you may have surgery during pregnancy. This helps protect you and your baby s health.   [employer] last reviewed this educational content on 6/1/2019 2000-2020 [employer]. [address] [place] [address], [place] [address]. All rights reserved. This information is not intended as a substitute for professional medical care. Always follow your healthcare professional's instructions.

## 2021-02-26 NOTE — PROGRESS NOTES
SUBJECTIVE:   CC: Pat Dan is an 50 year old woman who presents for preventive health visit.       Patient has been advised of split billing requirements and indicates understanding: Yes  Healthy Habits:     Getting at least 3 servings of Calcium per day:  Yes    Bi-annual eye exam:  Yes    Dental care twice a year:  Yes    Sleep apnea or symptoms of sleep apnea:  None    Diet:  Regular (no restrictions)    Frequency of exercise:  6-7 days/week    Duration of exercise:  Greater than 60 minutes    Taking medications regularly:  Yes    Medication side effects:  None    PHQ-2 Total Score: 0    Additional concerns today:  Yes    She has been to audiology regarding her tinnitus and recently got hearing aides. She was asked by them if she has been to ENT before and she denied, shes requesting referral to ENT at this time.     Shes requesting referral to dermatology for possible removal of mole behind her right ear. She denies recent changes to mole. She states it occasionally has an odor to it.     She had dental work in January. She had been having discomfort and followed up with her dentist and they did further dental work on Monday. She states because of the discomfort, she had been taking quite a bit of tylenol lately (3 tablets in the morning and 3 in the evenings and infrequently more at night). She recent had elevated LFTs and an ultrasound earlier today.     She states she will get her second covid vaccine on Thursday.       Today's PHQ-2 Score:   PHQ-2 ( 1999 Pfizer) 2/26/2021   Q1: Little interest or pleasure in doing things 0   Q2: Feeling down, depressed or hopeless 0   PHQ-2 Score 0   Q1: Little interest or pleasure in doing things Not at all   Q2: Feeling down, depressed or hopeless Not at all   PHQ-2 Score 0       Abuse: Current or Past (Physical, Sexual or Emotional) - No  Do you feel safe in your environment? Yes    Have you ever done Advance Care Planning? (For example, a Health Directive,  POLST, or a discussion with a medical provider or your loved ones about your wishes): No, advance care planning information given to patient to review.  Patient plans to discuss their wishes with loved ones or provider.      Social History     Tobacco Use     Smoking status: Never Smoker     Smokeless tobacco: Never Used   Substance Use Topics     Alcohol use: No       Alcohol Use 2/26/2021   Prescreen: >3 drinks/day or >7 drinks/week? No   Prescreen: >3 drinks/day or >7 drinks/week? -       Reviewed orders with patient.  Reviewed health maintenance and updated orders accordingly - Yes        History of abnormal Pap smear:   PAP / HPV Latest Ref Rng & Units 4/9/2018 4/9/2014 6/1/2005   PAP - NIL NIL NIL   HPV 16 DNA NEG:Negative Negative - -   HPV 18 DNA NEG:Negative Negative - -   OTHER HR HPV NEG:Negative Negative - -     Reviewed and updated as needed this visit by clinical staff  Tobacco  Allergies  Meds  Problems  Med Hx  Surg Hx  Fam Hx  Soc Hx          Reviewed and updated as needed this visit by Provider     Problems                Review of Systems   Constitutional: Negative for chills and fever.   HENT: Positive for ear pain and hearing loss. Negative for congestion and sore throat.    Eyes: Positive for visual disturbance. Negative for pain.   Respiratory: Negative for cough and shortness of breath.    Cardiovascular: Negative for chest pain, palpitations and peripheral edema.   Gastrointestinal: Negative for abdominal pain, constipation, diarrhea, heartburn, hematochezia and nausea.   Breasts:  Negative for tenderness, breast mass and discharge.   Genitourinary: Negative for dysuria, frequency, genital sores, hematuria, pelvic pain, urgency, vaginal bleeding and vaginal discharge.   Musculoskeletal: Negative for arthralgias, joint swelling and myalgias.   Skin: Negative for rash.   Neurological: Negative for dizziness, weakness, headaches and paresthesias.   Psychiatric/Behavioral: Negative for  "mood changes. The patient is not nervous/anxious.        OBJECTIVE:   /80 (Patient Position: Sitting, Cuff Size: Adult Regular)   Ht 1.702 m (5' 7\")   Wt 83 kg (183 lb)   BMI 28.66 kg/m    Physical Exam  GENERAL: healthy, alert and no distress  EYES: Eyes grossly normal to inspection, PERRL and conjunctivae and sclerae normal  HENT: ear canals and TM's normal, nose and mouth without ulcers or lesions  NECK: no adenopathy, no asymmetry, masses, or scars and thyroid normal to palpation  RESP: lungs clear to auscultation - no rales, rhonchi or wheezes  BREAST: normal without masses, tenderness or nipple discharge and no palpable axillary masses or adenopathy  CV: regular rate and rhythm, normal S1 S2, no S3 or S4, no murmur, click or rub, no peripheral edema and peripheral pulses strong  ABDOMEN: soft, nontender, no hepatosplenomegaly, no masses and bowel sounds normal   (female): deferred today  MS: no gross musculoskeletal defects noted, no edema  SKIN: no suspicious lesions or rashes  NEURO: Normal strength and tone, mentation intact and speech normal  PSYCH: mentation appears normal, affect normal/bright      ASSESSMENT/PLAN:   1. Routine general medical examination at a health care facility  shes had a supracervical hysterectomy due to retained placenta. Denies history of abnormal paps. Last normal pap with HPV testing was in 2018.     2. Screen for colon cancer  - GASTROENTEROLOGY ADULT REF PROCEDURE ONLY; Future    3. Screening for hyperlipidemia  - Lipid panel reflex to direct LDL Fasting; Future    4. Encounter for screening mammogram for breast cancer  - MA SCREENING DIGITAL BILAT - Future  (s+30); Future    5. Skin lesion  - DERMATOLOGY ADULT REFERRAL; Future  - OFFICE/OUTPT VISIT,EST,LEVL III    6. Elevated LFTs  Will place future order to have LFTs re-checked. Encouraged pt to limit tylenol intake.   - Hepatic panel (Albumin, ALT, AST, Bili, Alk Phos, TP); Future  - OFFICE/OUTPT VISIT,EST,LEVL " "III    7. Low iron  She started taking iron supplements recently but is unsure of the dose. Reviewed recent lab results with pt. Future orders placed to have labs re-checked after being on iron supplements for a few months   - **CBC with platelets FUTURE anytime; Future  - **Iron and iron binding capacity FUTURE anytime; Future  - Ferritin; Future  - OFFICE/OUTPT VISIT,EST,LEVL III    8. Tinnitus, bilateral  - OTOLARYNGOLOGY REFERRAL  - OFFICE/OUTPT VISIT,EST,LEVL III    COUNSELING:  Reviewed preventive health counseling, as reflected in patient instructions    Estimated body mass index is 28.66 kg/m  as calculated from the following:    Height as of this encounter: 1.702 m (5' 7\").    Weight as of this encounter: 83 kg (183 lb).    Weight management plan: Discussed healthy diet and exercise guidelines    She reports that she has never smoked. She has never used smokeless tobacco.      Counseling Resources:  ATP IV Guidelines  Pooled Cohorts Equation Calculator  Breast Cancer Risk Calculator  BRCA-Related Cancer Risk Assessment: FHS-7 Tool  FRAX Risk Assessment  ICSI Preventive Guidelines  Dietary Guidelines for Americans, 2010  USDA's MyPlate  ASA Prophylaxis  Lung CA Screening    Crystal Llamas NP  [facility]  "

## 2021-02-27 PROBLEM — E61.1 LOW IRON: Status: ACTIVE | Noted: 2017-04-26

## 2021-02-27 PROBLEM — A63.0 CONDYLOMATA ACUMINATA IN FEMALE: Status: RESOLVED | Noted: 2017-09-12 | Resolved: 2021-02-27

## 2021-02-27 PROBLEM — H93.13 TINNITUS, BILATERAL: Status: ACTIVE | Noted: 2021-02-27

## 2021-02-27 PROBLEM — L98.9 SKIN LESION: Status: RESOLVED | Noted: 2021-02-27 | Resolved: 2021-02-27

## 2021-02-27 PROBLEM — R79.89 ELEVATED LFTS: Status: ACTIVE | Noted: 2021-02-27

## 2021-02-27 PROBLEM — L98.9 SKIN LESION: Status: ACTIVE | Noted: 2021-02-27

## 2021-03-01 DIAGNOSIS — Z11.59 ENCOUNTER FOR SCREENING FOR OTHER VIRAL DISEASES: ICD-10-CM

## 2021-03-04 ENCOUNTER — ANCILLARY PROCEDURE (OUTPATIENT)
Dept: MAMMOGRAPHY | Facility: CLINIC | Age: 51
End: 2021-03-04
Attending: NURSE PRACTITIONER
Payer: COMMERCIAL

## 2021-03-04 DIAGNOSIS — Z12.31 ENCOUNTER FOR SCREENING MAMMOGRAM FOR BREAST CANCER: ICD-10-CM

## 2021-03-04 PROCEDURE — 77067 SCR MAMMO BI INCL CAD: CPT | Mod: TC | Performed by: RADIOLOGY

## 2021-03-09 ENCOUNTER — OFFICE VISIT (OUTPATIENT)
Dept: DERMATOLOGY | Facility: CLINIC | Age: 51
End: 2021-03-09
Payer: COMMERCIAL

## 2021-03-09 ENCOUNTER — VIRTUAL VISIT (OUTPATIENT)
Dept: ENDOCRINOLOGY | Facility: CLINIC | Age: 51
End: 2021-03-09
Payer: COMMERCIAL

## 2021-03-09 VITALS — SYSTOLIC BLOOD PRESSURE: 127 MMHG | OXYGEN SATURATION: 98 % | DIASTOLIC BLOOD PRESSURE: 81 MMHG | HEART RATE: 67 BPM

## 2021-03-09 DIAGNOSIS — D23.9 DERMAL NEVUS: ICD-10-CM

## 2021-03-09 DIAGNOSIS — L73.8 SENILE SEBACEOUS GLAND HYPERPLASIA: ICD-10-CM

## 2021-03-09 DIAGNOSIS — L81.4 LENTIGO: ICD-10-CM

## 2021-03-09 DIAGNOSIS — L82.1 SEBORRHEIC KERATOSIS: Primary | ICD-10-CM

## 2021-03-09 DIAGNOSIS — L82.0 INFLAMED SEBORRHEIC KERATOSIS: ICD-10-CM

## 2021-03-09 DIAGNOSIS — R79.89 LOW TESTOSTERONE LEVEL IN FEMALE: Primary | ICD-10-CM

## 2021-03-09 PROCEDURE — 99203 OFFICE O/P NEW LOW 30 MIN: CPT | Mod: 25 | Performed by: DERMATOLOGY

## 2021-03-09 PROCEDURE — 17110 DESTRUCTION B9 LES UP TO 14: CPT | Performed by: DERMATOLOGY

## 2021-03-09 PROCEDURE — 99203 OFFICE O/P NEW LOW 30 MIN: CPT | Mod: 95 | Performed by: INTERNAL MEDICINE

## 2021-03-09 RX ORDER — SODIUM, POTASSIUM,MAG SULFATES 17.5-3.13G
1 SOLUTION, RECONSTITUTED, ORAL ORAL SEE ADMIN INSTRUCTIONS
Qty: 2 BOTTLE | Refills: 0 | Status: SHIPPED | OUTPATIENT
Start: 2021-03-09 | End: 2024-09-03

## 2021-03-09 RX ORDER — BISACODYL 5 MG/1
5 TABLET, DELAYED RELEASE ORAL SEE ADMIN INSTRUCTIONS
Qty: 1 TABLET | Refills: 0 | Status: SHIPPED | OUTPATIENT
Start: 2021-03-09 | End: 2024-09-03

## 2021-03-09 NOTE — LETTER
3/9/2021         RE: Pat Dan  [address]  [facility] [address]        Dear Colleague,    Thank you for referring your patient, Pat Dan, to the [facility]. Please see a copy of my visit note below.    Pat Dan is an extremely pleasant 50 year old year old female patient here today for itching spot behind right ear and spots on face.   .   Patient states this has been present for a while.  Patient reports the following symptoms:  itching.  Patient reports the following previous treatments none.  These treatments did not work.  Patient reports the following modifying factors none.  Associated symptoms: none.  Patient has no other skin complaints today.  Remainder of the HPI, Meds, PMH, Allergies, FH, and SH was reviewed in chart.      Past Medical History:   Diagnosis Date     Impingement syndrome of shoulder 2/4/2011     Retained placenta without hemorrhage, unspecified as to episode of care 2005    placenta acreta     Rotator cuff tendinitis 7/2/2009     S/P hysterectomy     retained placenta     Shoulder pain 11/15/2011     Subdeltoid bursitis 7/2/2009       Past Surgical History:   Procedure Laterality Date     SURGICAL HISTORY OF -   age 16    laparoscopy     SURGICAL HISTORY OF -   12/16/05    D & C, supracervical hysterectomy        Family History   Problem Relation Age of Onset     Genitourinary Problems Maternal Grandmother         kidney disease     Genetic Disorder Other         Cystic fibrosis     Breast Cancer Paternal Aunt 55     Breast Cancer Cousin 42       Social History     Socioeconomic History     Marital status:      Spouse name: Not on file     Number of children: Not on file     Years of education: Not on file     Highest education level: Not on file   Occupational History     Not on file   Social Needs     Financial resource strain: Not on file     Food insecurity     Worry: Not on file     Inability: Not on file      Transportation needs     Medical: Not on file     Non-medical: Not on file   Tobacco Use     Smoking status: Never Smoker     Smokeless tobacco: Never Used   Substance and Sexual Activity     Alcohol use: No     Drug use: No     Sexual activity: Not Currently     Partners: Male     Birth control/protection: Surgical     Comment: hyster.   Lifestyle     Physical activity     Days per week: Not on file     Minutes per session: Not on file     Stress: Not on file   Relationships     Social connections     Talks on phone: Not on file     Gets together: Not on file     Attends Gnosticism service: Not on file     Active member of club or organization: Not on file     Attends meetings of clubs or organizations: Not on file     Relationship status: Not on file     Intimate partner violence     Fear of current or ex partner: Not on file     Emotionally abused: Not on file     Physically abused: Not on file     Forced sexual activity: Not on file   Other Topics Concern     Parent/sibling w/ CABG, MI or angioplasty before 65F 55M? Not Asked   Social History Narrative     Not on file       Outpatient Encounter Medications as of 3/9/2021   Medication Sig Dispense Refill     Ascorbic Acid (VITAMIN C PO)        bisacodyl (DULCOLAX) 5 MG EC tablet Take 1 tablet (5 mg) by mouth See Admin Instructions -Day prior to procedure take 1 tablet bisacodyl at 12:00. 1 tablet 0     Ferrous Sulfate (IRON PO)        Multiple Vitamin (MULTI-DAY PO)        Na Sulfate-K Sulfate-Mg Sulf (SUPREP BOWEL PREP KIT) solution Take 177 mLs (1 Bottle) by mouth See Admin Instructions -Evening before procedure complete steps 1 through 4 (see package) using one 6 ounce bottle before bed.  Morning of procedure, repeat steps 1 through 4 using the other 6 ounce bottle. 2 Bottle 0     polyethylene glycol (GOLYTELY) 236 g suspension Take 4,000 mLs by mouth See Admin Instructions -Mix GoLytely as directed on container.  At 6:00 PM, drink an 8 Oz glass of solution and  continue drinking 8 Oz glass every 15 minutes until bottle is empty. 4000 mL 0     Simethicone 125 MG TABS Take 125 mg by mouth See Admin Instructions -Take tablet after finishing second half of Suprep with half a glass of water. 1 tablet 0     Simethicone 125 MG TABS Take 125 mg by mouth See Admin Instructions 1 tablet 0     Turmeric (QC TUMERIC COMPLEX PO)        No facility-administered encounter medications on file as of 3/9/2021.              Review Of Systems  Skin: As above  Eyes: negative  Ears/Nose/Throat: negative  Respiratory: No shortness of breath, dyspnea on exertion, cough, or hemoptysis  Cardiovascular: negative  Gastrointestinal: negative  Genitourinary: negative  Musculoskeletal: negative  Neurologic: negative  Psychiatric: negative  Hematologic/Lymphatic/Immunologic: negative  Endocrine: negative      O:   NAD, WDWN, Alert & Oriented, Mood & Affect wnl, Vitals stable   Here today alone   /81   Pulse 67   SpO2 98%    General appearance normal   Vitals stable   Alert, oriented and in no acute distress        Stuck on papules and brown macules on face  Yellow papules on face  Flesh colored papules on face and scalp  R post auricular stuck on brown scaly papule      The remainder of expanded problem focused exam was normal; the following areas were examined:  , conjunctiva/lids, face, neck, lips,       Eyes: Conjunctivae/lids:Normal     ENT: Lips, buccal mucosa, tongue: normal    MSK:Normal    Cardiovascular: peripheral edema none    Pulm: Breathing Normal    Neuro/Psych: Orientation:Alert and Orientedx3 ; Mood/Affect:normal       A/P:  1. R post auricular inflamed seborrheic keratosis   LN2:  Treated with LN2 for 5s for 1-2 cycles. Warned risks of blistering, pain, pigment change, scarring, and incomplete resolution.  Advised patient to return if lesions do not completely resolve.  Wound care sheet given.  2. Dermal nevus, seborrheic keratosis, lentigo, sebaceous hyperplasia   It was a  pleasure speaking to Pat Dan today.  Previous clinic  notes and pertinent laboratory tests were reviewed prior to Pat Dan's visit.    Nature and genetics of benign skin lesions dicussed with patient.  Signs and Symptoms of skin cancer discussed with patient.  Patient encouraged to perform monthly skin exams.  UV precautions reviewed with patient.  Return to clinic 12 months        Again, thank you for allowing me to participate in the care of your patient.        Sincerely,        Wm Vergara MD

## 2021-03-09 NOTE — PROGRESS NOTES
Pat is a 50 year old who is being evaluated via a billable video visit.      How would you like to obtain your AVS? MyChart  If the video visit is dropped, the invitation should be resent by: Text to cell phone: [phone]  Will anyone else be joining your video visit? No      Video Start Time: 8:02 AM    CC: Low testosterone.     HPI:   Patient presents for evaluation of low testosterone.   States she went in to talk to her physician about struggling to loose weight.   She has been working a dietician since 2020.   Exercises 5/week.     She had her uterus removed after an emergency  when her daughter was born.   Ovaries left in place. Occasional hot flash. She does have a low sex drive.     No skin or hair changes.     No recent change to medications/supplements.     ROS: 10 point ROS neg other than the symptoms noted above in the HPI.    PMH:   Patient Active Problem List   Diagnosis     Low iron     AC (acromioclavicular) arthritis     Tinnitus, bilateral     Elevated LFTs      Meds:  Current Outpatient Medications   Medication     Ascorbic Acid (VITAMIN C PO)     bisacodyl (DULCOLAX) 5 MG EC tablet     Ferrous Sulfate (IRON PO)     Multiple Vitamin (MULTI-DAY PO)     Na Sulfate-K Sulfate-Mg Sulf (SUPREP BOWEL PREP KIT) solution     polyethylene glycol (GOLYTELY) 236 g suspension     Simethicone 125 MG TABS     Simethicone 125 MG TABS     Turmeric (QC TUMERIC COMPLEX PO)     No current facility-administered medications for this visit.       FHX:   No known adrenal disease or premature menopause.   No known CAD.     SHX:  Cares for her son with special needs.     Exam:   GENERAL: Healthy, alert and no distress  EYES: Eyes grossly normal to inspection.  No discharge or erythema, or obvious scleral/conjunctival abnormalities.  NECK: No asymmetry, visible masses or scars  RESP: No audible wheeze, cough, or visible cyanosis.  No visible retractions or increased work of breathing.    MS: No gross  musculoskeletal defects noted.  Normal range of motion.  No visible edema.  SKIN: Visible skin clear. No significant rash, abnormal pigmentation or lesions.  NEURO: Cranial nerves grossly intact.  Mentation and speech appropriate for age.  PSYCH: Mentation appears normal, affect normal/bright, judgement and insight intact, normal speech and appearance well-groomed.     A/P:   Low testosterone in a female - Checked as part of a workup for fatigue and struggling to loose weight. Likely low due to ovarian atrophy with menopause.Discussed role of testosterone in the body and evidence for supplementation in women. Reviewed how at this time, it is only shown to improve libido in women as a reliable effect. She has elevated LFT's with an unremarkable liver US. She would need ongoing monitoring of LFT's if she were to use testosterone. Discussed how testosterone is not a proven weight loss drug. Her TSH was normal. Discussed risks/benefits of testosterone use.   -We agreed to forego testosterone supplementation at this time.     Wm Hoff M.D       Video-Visit Details    Type of service:  Video Visit    Video End Time:0828    Originating Location (pt. Location): Home    Distant Location (provider location):  [facility]     Platform used for Video Visit: TapEngage

## 2021-03-09 NOTE — PROGRESS NOTES
Pat Dan is an extremely pleasant 50 year old year old female patient here today for itching spot behind right ear and spots on face.   .   Patient states this has been present for a while.  Patient reports the following symptoms:  itching.  Patient reports the following previous treatments none.  These treatments did not work.  Patient reports the following modifying factors none.  Associated symptoms: none.  Patient has no other skin complaints today.  Remainder of the HPI, Meds, PMH, Allergies, FH, and SH was reviewed in chart.      Past Medical History:   Diagnosis Date     Impingement syndrome of shoulder 2/4/2011     Retained placenta without hemorrhage, unspecified as to episode of care 2005    placenta acreta     Rotator cuff tendinitis 7/2/2009     S/P hysterectomy     retained placenta     Shoulder pain 11/15/2011     Subdeltoid bursitis 7/2/2009       Past Surgical History:   Procedure Laterality Date     SURGICAL HISTORY OF -   age 16    laparoscopy     SURGICAL HISTORY OF -   12/16/05    D & C, supracervical hysterectomy        Family History   Problem Relation Age of Onset     Genitourinary Problems Maternal Grandmother         kidney disease     Genetic Disorder Other         Cystic fibrosis     Breast Cancer Paternal Aunt 55     Breast Cancer Cousin 42       Social History     Socioeconomic History     Marital status:      Spouse name: Not on file     Number of children: Not on file     Years of education: Not on file     Highest education level: Not on file   Occupational History     Not on file   Social Needs     Financial resource strain: Not on file     Food insecurity     Worry: Not on file     Inability: Not on file     Transportation needs     Medical: Not on file     Non-medical: Not on file   Tobacco Use     Smoking status: Never Smoker     Smokeless tobacco: Never Used   Substance and Sexual Activity     Alcohol use: No     Drug use: No     Sexual activity: Not Currently      Partners: Male     Birth control/protection: Surgical     Comment: hyster.   Lifestyle     Physical activity     Days per week: Not on file     Minutes per session: Not on file     Stress: Not on file   Relationships     Social connections     Talks on phone: Not on file     Gets together: Not on file     Attends Jewish service: Not on file     Active member of club or organization: Not on file     Attends meetings of clubs or organizations: Not on file     Relationship status: Not on file     Intimate partner violence     Fear of current or ex partner: Not on file     Emotionally abused: Not on file     Physically abused: Not on file     Forced sexual activity: Not on file   Other Topics Concern     Parent/sibling w/ CABG, MI or angioplasty before 65F 55M? Not Asked   Social History Narrative     Not on file       Outpatient Encounter Medications as of 3/9/2021   Medication Sig Dispense Refill     Ascorbic Acid (VITAMIN C PO)        bisacodyl (DULCOLAX) 5 MG EC tablet Take 1 tablet (5 mg) by mouth See Admin Instructions -Day prior to procedure take 1 tablet bisacodyl at 12:00. 1 tablet 0     Ferrous Sulfate (IRON PO)        Multiple Vitamin (MULTI-DAY PO)        Na Sulfate-K Sulfate-Mg Sulf (SUPREP BOWEL PREP KIT) solution Take 177 mLs (1 Bottle) by mouth See Admin Instructions -Evening before procedure complete steps 1 through 4 (see package) using one 6 ounce bottle before bed.  Morning of procedure, repeat steps 1 through 4 using the other 6 ounce bottle. 2 Bottle 0     polyethylene glycol (GOLYTELY) 236 g suspension Take 4,000 mLs by mouth See Admin Instructions -Mix GoLytely as directed on container.  At 6:00 PM, drink an 8 Oz glass of solution and continue drinking 8 Oz glass every 15 minutes until bottle is empty. 4000 mL 0     Simethicone 125 MG TABS Take 125 mg by mouth See Admin Instructions -Take tablet after finishing second half of Suprep with half a glass of water. 1 tablet 0     Simethicone 125  MG TABS Take 125 mg by mouth See Admin Instructions 1 tablet 0     Turmeric (QC TUMERIC COMPLEX PO)        No facility-administered encounter medications on file as of 3/9/2021.              Review Of Systems  Skin: As above  Eyes: negative  Ears/Nose/Throat: negative  Respiratory: No shortness of breath, dyspnea on exertion, cough, or hemoptysis  Cardiovascular: negative  Gastrointestinal: negative  Genitourinary: negative  Musculoskeletal: negative  Neurologic: negative  Psychiatric: negative  Hematologic/Lymphatic/Immunologic: negative  Endocrine: negative      O:   NAD, WDWN, Alert & Oriented, Mood & Affect wnl, Vitals stable   Here today alone   /81   Pulse 67   SpO2 98%    General appearance normal   Vitals stable   Alert, oriented and in no acute distress        Stuck on papules and brown macules on face  Yellow papules on face  Flesh colored papules on face and scalp  R post auricular stuck on brown scaly papule      The remainder of expanded problem focused exam was normal; the following areas were examined:  , conjunctiva/lids, face, neck, lips,       Eyes: Conjunctivae/lids:Normal     ENT: Lips, buccal mucosa, tongue: normal    MSK:Normal    Cardiovascular: peripheral edema none    Pulm: Breathing Normal    Neuro/Psych: Orientation:Alert and Orientedx3 ; Mood/Affect:normal       A/P:  1. R post auricular inflamed seborrheic keratosis   LN2:  Treated with LN2 for 5s for 1-2 cycles. Warned risks of blistering, pain, pigment change, scarring, and incomplete resolution.  Advised patient to return if lesions do not completely resolve.  Wound care sheet given.  2. Dermal nevus, seborrheic keratosis, lentigo, sebaceous hyperplasia   It was a pleasure speaking to Pat Dan today.  Previous clinic  notes and pertinent laboratory tests were reviewed prior to Pat Dan's visit.    Nature and genetics of benign skin lesions dicussed with patient.  Signs and Symptoms of skin cancer discussed  with patient.  Patient encouraged to perform monthly skin exams.  UV precautions reviewed with patient.  Return to clinic 12 months

## 2021-03-09 NOTE — LETTER
3/9/2021         RE: Pat Dan  [address] [facility] [address]        Dear Colleague,    Thank you for referring your patient, Pat Dan, to the [facility]. Please see a copy of my visit note below.    Pat is a 50 year old who is being evaluated via a billable video visit.      How would you like to obtain your AVS? MyChart  If the video visit is dropped, the invitation should be resent by: Text to cell phone: [phone]  Will anyone else be joining your video visit? No      Video Start Time: 8:02 AM    CC: Low testosterone.     HPI:   Patient presents for evaluation of low testosterone.   States she went in to talk to her physician about struggling to loose weight.   She has been working a dietician since 2020.   Exercises 5/week.     She had her uterus removed after an emergency  when her daughter was born.   Ovaries left in place. Occasional hot flash. She does have a low sex drive.     No skin or hair changes.     No recent change to medications/supplements.     ROS: 10 point ROS neg other than the symptoms noted above in the HPI.    PMH:   Patient Active Problem List   Diagnosis     Low iron     AC (acromioclavicular) arthritis     Tinnitus, bilateral     Elevated LFTs      Meds:  Current Outpatient Medications   Medication     Ascorbic Acid (VITAMIN C PO)     bisacodyl (DULCOLAX) 5 MG EC tablet     Ferrous Sulfate (IRON PO)     Multiple Vitamin (MULTI-DAY PO)     Na Sulfate-K Sulfate-Mg Sulf (SUPREP BOWEL PREP KIT) solution     polyethylene glycol (GOLYTELY) 236 g suspension     Simethicone 125 MG TABS     Simethicone 125 MG TABS     Turmeric (QC TUMERIC COMPLEX PO)     No current facility-administered medications for this visit.       FHX:   No known adrenal disease or premature menopause.   No known CAD.     SHX:  Cares for her son with special needs.     Exam:   GENERAL: Healthy, alert and no distress  EYES: Eyes grossly normal to inspection.   No discharge or erythema, or obvious scleral/conjunctival abnormalities.  NECK: No asymmetry, visible masses or scars  RESP: No audible wheeze, cough, or visible cyanosis.  No visible retractions or increased work of breathing.    MS: No gross musculoskeletal defects noted.  Normal range of motion.  No visible edema.  SKIN: Visible skin clear. No significant rash, abnormal pigmentation or lesions.  NEURO: Cranial nerves grossly intact.  Mentation and speech appropriate for age.  PSYCH: Mentation appears normal, affect normal/bright, judgement and insight intact, normal speech and appearance well-groomed.     A/P:   Low testosterone in a female - Checked as part of a workup for fatigue and struggling to loose weight. Likely low due to ovarian atrophy with menopause.Discussed role of testosterone in the body and evidence for supplementation in women. Reviewed how at this time, it is only shown to improve libido in women as a reliable effect. She has elevated LFT's with an unremarkable liver US. She would need ongoing monitoring of LFT's if she were to use testosterone. Discussed how testosterone is not a proven weight loss drug. Her TSH was normal. Discussed risks/benefits of testosterone use.   -We agreed to forego testosterone supplementation at this time.     Wm Hoff M.D       Video-Visit Details    Type of service:  Video Visit    Video End Time:0828    Originating Location (pt. Location): Home    Distant Location (provider location):  [facility]     Platform used for Video Visit: Chuy        Again, thank you for allowing me to participate in the care of your patient.        Sincerely,        Wm Hoff MD

## 2021-03-10 RX ORDER — LACTOBACILLUS RHAMNOSUS GG 10B CELL
1 CAPSULE ORAL 2 TIMES DAILY
COMMUNITY
End: 2024-09-03

## 2021-03-10 ASSESSMENT — MIFFLIN-ST. JEOR: SCORE: 1469.1

## 2021-03-13 DIAGNOSIS — Z11.59 ENCOUNTER FOR SCREENING FOR OTHER VIRAL DISEASES: ICD-10-CM

## 2021-03-13 LAB
SARS-COV-2 RNA RESP QL NAA+PROBE: NORMAL
SPECIMEN SOURCE: NORMAL

## 2021-03-13 PROCEDURE — U0005 INFEC AGEN DETEC AMPLI PROBE: HCPCS | Performed by: SURGERY

## 2021-03-13 PROCEDURE — U0003 INFECTIOUS AGENT DETECTION BY NUCLEIC ACID (DNA OR RNA); SEVERE ACUTE RESPIRATORY SYNDROME CORONAVIRUS 2 (SARS-COV-2) (CORONAVIRUS DISEASE [COVID-19]), AMPLIFIED PROBE TECHNIQUE, MAKING USE OF HIGH THROUGHPUT TECHNOLOGIES AS DESCRIBED BY CMS-2020-01-R: HCPCS | Performed by: SURGERY

## 2021-03-14 LAB
LABORATORY COMMENT REPORT: NORMAL
SARS-COV-2 RNA RESP QL NAA+PROBE: NEGATIVE
SPECIMEN SOURCE: NORMAL

## 2021-03-16 ENCOUNTER — TELEPHONE (OUTPATIENT)
Dept: FAMILY MEDICINE | Facility: CLINIC | Age: 51
End: 2021-03-16

## 2021-03-16 ENCOUNTER — OFFICE VISIT (OUTPATIENT)
Dept: OTOLARYNGOLOGY | Facility: CLINIC | Age: 51
End: 2021-03-16
Payer: COMMERCIAL

## 2021-03-16 VITALS
HEIGHT: 67 IN | WEIGHT: 183 LBS | DIASTOLIC BLOOD PRESSURE: 76 MMHG | HEART RATE: 80 BPM | SYSTOLIC BLOOD PRESSURE: 125 MMHG | OXYGEN SATURATION: 97 % | BODY MASS INDEX: 28.72 KG/M2

## 2021-03-16 DIAGNOSIS — H93.13 TINNITUS, BILATERAL: Primary | ICD-10-CM

## 2021-03-16 PROCEDURE — 99214 OFFICE O/P EST MOD 30 MIN: CPT | Performed by: OTOLARYNGOLOGY

## 2021-03-16 ASSESSMENT — MIFFLIN-ST. JEOR: SCORE: 1482.71

## 2021-03-16 NOTE — PROGRESS NOTES
Chief Complaint - Tinnitus    History of Present Illness - Pat Dan is a 50 year old female who presents to me today with ringing in the ears.  It has been present and noticeable for approximately 1 year. It is very distressing for her. 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 It is worse at night when she lies down to sleep. She feels hearing is okay. Has hearing aids and notes they amplify everything and do help some. There is no history of chronic ear disease or ear surgery. With regards to recreational, , and work-related noise exposure she had a lot working in a noisy restaurant. No family history of hearing loss at a young age. No tinnitus in parents. No regular use of aspirin or NSAIDS. I personally reviewed the relevant clinical notes in Epic including the last ENT note. She was seen 8/13/2020 by Dr. Hankins. Audiogram at that time reviewed showing bilateral sensorineural hearing loss, mild.      She has tried alternative medications such as vitamin B, tumaric, massage, chiropracter. She also tried prednisone. She has tried hearing aids. Hearing aids help some, but not at night when she cannot use them. It is always worse when things are quiet. She has put on masking.     Past Medical History -   Patient Active Problem List   Diagnosis     Low iron     AC (acromioclavicular) arthritis     Tinnitus, bilateral     Elevated LFTs       Current Medications -   Current Outpatient Medications:      Ascorbic Acid (VITAMIN C PO), , Disp: , Rfl:      bisacodyl (DULCOLAX) 5 MG EC tablet, Take 1 tablet (5 mg) by mouth See Admin Instructions -Day prior to procedure take 1 tablet bisacodyl at 12:00., Disp: 1 tablet, Rfl: 0     Ferrous Sulfate (IRON PO), , Disp: , Rfl:      lactobacillus rhamnosus, GG, (CULTURELL) capsule, Take 1 capsule by mouth 2 times daily, Disp: , Rfl:      Multiple Vitamin (MULTI-DAY PO), , Disp: , Rfl:      Na  Sulfate-K Sulfate-Mg Sulf (SUPREP BOWEL PREP KIT) solution, Take 177 mLs (1 Bottle) by mouth See Admin Instructions -Evening before procedure complete steps 1 through 4 (see package) using one 6 ounce bottle before bed.  Morning of procedure, repeat steps 1 through 4 using the other 6 ounce bottle., Disp: 2 Bottle, Rfl: 0     polyethylene glycol (GOLYTELY) 236 g suspension, Take 4,000 mLs by mouth See Admin Instructions -Mix GoLytely as directed on container.  At 6:00 PM, drink an 8 Oz glass of solution and continue drinking 8 Oz glass every 15 minutes until bottle is empty., Disp: 4000 mL, Rfl: 0     Simethicone 125 MG TABS, Take 125 mg by mouth See Admin Instructions -Take tablet after finishing second half of Suprep with half a glass of water., Disp: 1 tablet, Rfl: 0     Simethicone 125 MG TABS, Take 125 mg by mouth See Admin Instructions, Disp: 1 tablet, Rfl: 0     Turmeric (QC TUMERIC COMPLEX PO), , Disp: , Rfl:     Allergies - No Known Allergies    Social History -   Social History     Socioeconomic History     Marital status:      Spouse name: Not on file     Number of children: Not on file     Years of education: Not on file     Highest education level: Not on file   Occupational History     Not on file   Social Needs     Financial resource strain: Not on file     Food insecurity     Worry: Not on file     Inability: Not on file     Transportation needs     Medical: Not on file     Non-medical: Not on file   Tobacco Use     Smoking status: Never Smoker     Smokeless tobacco: Never Used   Substance and Sexual Activity     Alcohol use: No     Drug use: No     Sexual activity: Not Currently     Partners: Male     Birth control/protection: Surgical     Comment: hyster.   Lifestyle     Physical activity     Days per week: Not on file     Minutes per session: Not on file     Stress: Not on file   Relationships     Social connections     Talks on phone: Not on file     Gets together: Not on file     Attends  "Holiness service: Not on file     Active member of club or organization: Not on file     Attends meetings of clubs or organizations: Not on file     Relationship status: Not on file     Intimate partner violence     Fear of current or ex partner: Not on file     Emotionally abused: Not on file     Physically abused: Not on file     Forced sexual activity: Not on file   Other Topics Concern     Parent/sibling w/ CABG, MI or angioplasty before 65F 55M? Not Asked   Social History Narrative     Not on file       Family History -   Family History   Problem Relation Age of Onset     Genitourinary Problems Maternal Grandmother         kidney disease     Genetic Disorder Other         Cystic fibrosis     Breast Cancer Paternal Aunt 55     Breast Cancer Cousin 42       Review of Systems - As per HPI and PMHx, otherwise 10+ system review is negative.    Physical Exam  /76   Pulse 80   Ht 1.702 m (5' 7\")   Wt 83 kg (183 lb)   SpO2 97%   BMI 28.66 kg/m    General - The patient was quite tearful during the visit. has a lot of stressors. Alert and oriented to person and place, answers questions and cooperates with examination appropriately.   Neurologic - CN II-XII are grossly intact. No focal neurologic deficits.   Voice and Breathing - The patient was breathing comfortably without the use of accessory muscles. There was no wheezing, stridor, or stertor.  The patients voice was clear and strong.  Ears - The tympanic membranes are normal in appearance, bony landmarks are intact.  No retraction, perforation, or masses. No fluid or purulence was seen in the external canal or the middle ear. No evidence of infection of the middle ear or external canal, cerumen was normal in appearance.  Eyes - Extraocular movements intact.  Sclera were not icteric or injected, conjunctiva were pink and moist.  Neck - Soft, non-tender. Palpation of the occipital, submental, submandibular, internal jugular chain, and supraclavicular nodes " did not demonstrate any abnormal lymph nodes or masses. No parotid masses. Palpation of the thyroid was soft and smooth, with no nodules or goiter appreciated.  The trachea was mobile and midline.        Assessment and Plan - Pat Dan is a 50 year old female who presents to me today with subjective tinnitus, likely due to sensorineural hearing loss.  I can find no evidence of serious CNS disorders or other complicating factors that could be causing this.  However she is quite distressed by this.  She was tearful at times and certainly has a strong component of anxiety and or depression with this.  I think this is likely making the tinnitus worse.  I want her to see tinnitus support program or tinnitus retraining therapy.  Referral provided.  I also want her to see her primary care doctor to discuss medication for anxiety depression and also meet with a therapist.     We spent the remainder of today's visit on education. Discussed were steps that can be taken to mask the noise, such as a low volume de-tuned radio, a fan in the background, and/or hearing aids.  Correlation with stress, anxiety, and depression were also discussed.  The patient was also cautioned on the numerous expensive non-pharmaceutical options that are advertised, and have no proven benefit.    The patient will follow up as necessary for worsening symptoms or changes in symptoms. I have also recommended yearly audiograms. Continue hearing aids.       Sanford Mata MD  Otolaryngology  [facility]

## 2021-03-16 NOTE — LETTER
3/16/2021         RE: Pat Dan  [address] [facility] [address]        Dear Colleague,    Thank you for referring your patient, Pat Dan, to the [facility]. Please see a copy of my visit note below.    Chief Complaint - Tinnitus    History of Present Illness - Pat Dan is a 50 year old female who presents to me today with ringing in the ears.  It has been present and noticeable for approximately 1 year. It is very distressing for her. 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 It is worse at night when she lies down to sleep. She feels hearing is okay. Has hearing aids and notes they amplify everything and do help some. There is no history of chronic ear disease or ear surgery. With regards to recreational, , and work-related noise exposure she had a lot working in a noisy restaurant. No family history of hearing loss at a young age. No tinnitus in parents. No regular use of aspirin or NSAIDS. I personally reviewed the relevant clinical notes in Epic including the last ENT note. She was seen 8/13/2020 by Dr. Hankins. Audiogram at that time reviewed showing bilateral sensorineural hearing loss, mild.      She has tried alternative medications such as vitamin B, tumaric, massage, chiropracter. She also tried prednisone. She has tried hearing aids. Hearing aids help some, but not at night when she cannot use them. It is always worse when things are quiet. She has put on masking.     Past Medical History -   Patient Active Problem List   Diagnosis     Low iron     AC (acromioclavicular) arthritis     Tinnitus, bilateral     Elevated LFTs       Current Medications -   Current Outpatient Medications:      Ascorbic Acid (VITAMIN C PO), , Disp: , Rfl:      bisacodyl (DULCOLAX) 5 MG EC tablet, Take 1 tablet (5 mg) by mouth See Admin Instructions -Day prior to procedure take 1 tablet  bisacodyl at 12:00., Disp: 1 tablet, Rfl: 0     Ferrous Sulfate (IRON PO), , Disp: , Rfl:      lactobacillus rhamnosus, GG, (CULTURELL) capsule, Take 1 capsule by mouth 2 times daily, Disp: , Rfl:      Multiple Vitamin (MULTI-DAY PO), , Disp: , Rfl:      Na Sulfate-K Sulfate-Mg Sulf (SUPREP BOWEL PREP KIT) solution, Take 177 mLs (1 Bottle) by mouth See Admin Instructions -Evening before procedure complete steps 1 through 4 (see package) using one 6 ounce bottle before bed.  Morning of procedure, repeat steps 1 through 4 using the other 6 ounce bottle., Disp: 2 Bottle, Rfl: 0     polyethylene glycol (GOLYTELY) 236 g suspension, Take 4,000 mLs by mouth See Admin Instructions -Mix GoLytely as directed on container.  At 6:00 PM, drink an 8 Oz glass of solution and continue drinking 8 Oz glass every 15 minutes until bottle is empty., Disp: 4000 mL, Rfl: 0     Simethicone 125 MG TABS, Take 125 mg by mouth See Admin Instructions -Take tablet after finishing second half of Suprep with half a glass of water., Disp: 1 tablet, Rfl: 0     Simethicone 125 MG TABS, Take 125 mg by mouth See Admin Instructions, Disp: 1 tablet, Rfl: 0     Turmeric (QC TUMERIC COMPLEX PO), , Disp: , Rfl:     Allergies - No Known Allergies    Social History -   Social History     Socioeconomic History     Marital status:      Spouse name: Not on file     Number of children: Not on file     Years of education: Not on file     Highest education level: Not on file   Occupational History     Not on file   Social Needs     Financial resource strain: Not on file     Food insecurity     Worry: Not on file     Inability: Not on file     Transportation needs     Medical: Not on file     Non-medical: Not on file   Tobacco Use     Smoking status: Never Smoker     Smokeless tobacco: Never Used   Substance and Sexual Activity     Alcohol use: No     Drug use: No     Sexual activity: Not Currently     Partners: Male     Birth control/protection: Surgical      "Comment: hyster.   Lifestyle     Physical activity     Days per week: Not on file     Minutes per session: Not on file     Stress: Not on file   Relationships     Social connections     Talks on phone: Not on file     Gets together: Not on file     Attends Pentecostal service: Not on file     Active member of club or organization: Not on file     Attends meetings of clubs or organizations: Not on file     Relationship status: Not on file     Intimate partner violence     Fear of current or ex partner: Not on file     Emotionally abused: Not on file     Physically abused: Not on file     Forced sexual activity: Not on file   Other Topics Concern     Parent/sibling w/ CABG, MI or angioplasty before 65F 55M? Not Asked   Social History Narrative     Not on file       Family History -   Family History   Problem Relation Age of Onset     Genitourinary Problems Maternal Grandmother         kidney disease     Genetic Disorder Other         Cystic fibrosis     Breast Cancer Paternal Aunt 55     Breast Cancer Cousin 42       Review of Systems - As per HPI and PMHx, otherwise 10+ system review is negative.    Physical Exam  /76   Pulse 80   Ht 1.702 m (5' 7\")   Wt 83 kg (183 lb)   SpO2 97%   BMI 28.66 kg/m    General - The patient was quite tearful during the visit. has a lot of stressors. Alert and oriented to person and place, answers questions and cooperates with examination appropriately.   Neurologic - CN II-XII are grossly intact. No focal neurologic deficits.   Voice and Breathing - The patient was breathing comfortably without the use of accessory muscles. There was no wheezing, stridor, or stertor.  The patients voice was clear and strong.  Ears - The tympanic membranes are normal in appearance, bony landmarks are intact.  No retraction, perforation, or masses. No fluid or purulence was seen in the external canal or the middle ear. No evidence of infection of the middle ear or external canal, cerumen was " normal in appearance.  Eyes - Extraocular movements intact.  Sclera were not icteric or injected, conjunctiva were pink and moist.  Neck - Soft, non-tender. Palpation of the occipital, submental, submandibular, internal jugular chain, and supraclavicular nodes did not demonstrate any abnormal lymph nodes or masses. No parotid masses. Palpation of the thyroid was soft and smooth, with no nodules or goiter appreciated.  The trachea was mobile and midline.        Assessment and Plan - Pat Dan is a 50 year old female who presents to me today with subjective tinnitus, likely due to sensorineural hearing loss.  I can find no evidence of serious CNS disorders or other complicating factors that could be causing this.  However she is quite distressed by this.  She was tearful at times and certainly has a strong component of anxiety and or depression with this.  I think this is likely making the tinnitus worse.  I want her to see tinnitus support program or tinnitus retraining therapy.  Referral provided.  I also want her to see her primary care doctor to discuss medication for anxiety depression and also meet with a therapist.     We spent the remainder of today's visit on education. Discussed were steps that can be taken to mask the noise, such as a low volume de-tuned radio, a fan in the background, and/or hearing aids.  Correlation with stress, anxiety, and depression were also discussed.  The patient was also cautioned on the numerous expensive non-pharmaceutical options that are advertised, and have no proven benefit.    The patient will follow up as necessary for worsening symptoms or changes in symptoms. I have also recommended yearly audiograms. Continue hearing aids.       Sanford Mata MD  Otolaryngology  [facility]          Again, thank you for allowing me to participate in the care of your patient.        Sincerely,        Sanford Mata MD

## 2021-03-16 NOTE — TELEPHONE ENCOUNTER
LVM for patient to schedule Tinnitus evaluation (TIN) per referral from Dr. Mata.     Left call center number for scheduling.

## 2021-03-17 ENCOUNTER — HOSPITAL ENCOUNTER (OUTPATIENT)
Facility: AMBULATORY SURGERY CENTER | Age: 51
Discharge: HOME | End: 2021-03-17
Attending: SURGERY | Admitting: SURGERY
Payer: COMMERCIAL

## 2021-03-17 VITALS
RESPIRATION RATE: 16 BRPM | DIASTOLIC BLOOD PRESSURE: 66 MMHG | HEART RATE: 67 BPM | OXYGEN SATURATION: 100 % | WEIGHT: 180 LBS | HEIGHT: 67 IN | SYSTOLIC BLOOD PRESSURE: 103 MMHG | TEMPERATURE: 96.9 F | BODY MASS INDEX: 28.25 KG/M2

## 2021-03-17 DIAGNOSIS — Z12.11 COLON CANCER SCREENING: Primary | ICD-10-CM

## 2021-03-17 LAB — COLONOSCOPY: NORMAL

## 2021-03-17 PROCEDURE — G8907 PT DOC NO EVENTS ON DISCHARG: HCPCS

## 2021-03-17 PROCEDURE — G8918 PT W/O PREOP ORDER IV AB PRO: HCPCS

## 2021-03-17 PROCEDURE — 99153 MOD SED SAME PHYS/QHP EA: CPT | Mod: 59 | Performed by: SURGERY

## 2021-03-17 PROCEDURE — 88305 TISSUE EXAM BY PATHOLOGIST: CPT | Performed by: PATHOLOGY

## 2021-03-17 PROCEDURE — 99152 MOD SED SAME PHYS/QHP 5/>YRS: CPT | Mod: 59 | Performed by: SURGERY

## 2021-03-17 PROCEDURE — 45380 COLONOSCOPY AND BIOPSY: CPT

## 2021-03-17 PROCEDURE — 45380 COLONOSCOPY AND BIOPSY: CPT | Mod: PT | Performed by: SURGERY

## 2021-03-17 RX ORDER — SODIUM CHLORIDE, SODIUM LACTATE, POTASSIUM CHLORIDE, CALCIUM CHLORIDE 600; 310; 30; 20 MG/100ML; MG/100ML; MG/100ML; MG/100ML
INJECTION, SOLUTION INTRAVENOUS CONTINUOUS PRN
Status: COMPLETED | OUTPATIENT
Start: 2021-03-17 | End: 2021-03-17

## 2021-03-17 RX ORDER — FENTANYL CITRATE 50 UG/ML
INJECTION, SOLUTION INTRAMUSCULAR; INTRAVENOUS PRN
Status: DISCONTINUED | OUTPATIENT
Start: 2021-03-17 | End: 2021-03-17 | Stop reason: HOSPADM

## 2021-03-17 RX ORDER — LIDOCAINE 40 MG/G
CREAM TOPICAL
Status: DISCONTINUED | OUTPATIENT
Start: 2021-03-17 | End: 2021-03-18 | Stop reason: HOSPADM

## 2021-03-17 RX ORDER — ONDANSETRON 2 MG/ML
4 INJECTION INTRAMUSCULAR; INTRAVENOUS
Status: COMPLETED | OUTPATIENT
Start: 2021-03-17 | End: 2021-03-17

## 2021-03-17 RX ADMIN — ONDANSETRON 4 MG: 2 INJECTION INTRAMUSCULAR; INTRAVENOUS at 08:00

## 2021-03-19 LAB — COPATH REPORT: NORMAL

## 2021-04-01 ENCOUNTER — ALLIED HEALTH/NURSE VISIT (OUTPATIENT)
Dept: NURSING | Facility: CLINIC | Age: 51
End: 2021-04-01
Payer: COMMERCIAL

## 2021-04-01 DIAGNOSIS — Z23 NEED FOR VACCINATION: Primary | ICD-10-CM

## 2021-04-01 PROCEDURE — 90707 MMR VACCINE SC: CPT

## 2021-04-01 PROCEDURE — 99207 PR NO CHARGE NURSE ONLY: CPT

## 2021-04-01 PROCEDURE — 90636 HEP A/HEP B VACC ADULT IM: CPT

## 2021-04-01 PROCEDURE — 90472 IMMUNIZATION ADMIN EACH ADD: CPT

## 2021-04-01 PROCEDURE — 90471 IMMUNIZATION ADMIN: CPT

## 2021-04-01 NOTE — PROGRESS NOTES
Prior to immunization administration, verified patients identity using patient s name and date of birth. Please see Immunization Activity for additional information.     Screening Questionnaire for Adult Immunization    Are you sick today?   No   Do you have allergies to medications, food, a vaccine component or latex?   No   Have you ever had a serious reaction after receiving a vaccination?   No   Do you have a long-term health problem with heart, lung, kidney, or metabolic disease (e.g., diabetes), asthma, a blood disorder, no spleen, complement component deficiency, a cochlear implant, or a spinal fluid leak?  Are you on long-term aspirin therapy?   No   Do you have cancer, leukemia, HIV/AIDS, or any other immune system problem?   No   Do you have a parent, brother, or sister with an immune system problem?   No   In the past 3 months, have you taken medications that affect  your immune system, such as prednisone, other steroids, or anticancer drugs; drugs for the treatment of rheumatoid arthritis, Crohn s disease, or psoriasis; or have you had radiation treatments?   No   Have you had a seizure, or a brain or other nervous system problem?   No   During the past year, have you received a transfusion of blood or blood    products, or been given immune (gamma) globulin or antiviral drug?   No   For women: Are you pregnant or is there a chance you could become       pregnant during the next month?   No   Have you received any vaccinations in the past 4 weeks?   No     Immunization questionnaire answers were all negative.       Patient instructed to remain in clinic for 15 minutes afterwards, and to report any adverse reaction to me immediately.       Screening performed by Tameka Escobar CMA on 4/1/2021 at 3:40 PM.

## 2021-10-23 ENCOUNTER — HEALTH MAINTENANCE LETTER (OUTPATIENT)
Age: 51
End: 2021-10-23

## 2022-04-09 ENCOUNTER — HEALTH MAINTENANCE LETTER (OUTPATIENT)
Age: 52
End: 2022-04-09

## 2022-06-04 ENCOUNTER — HEALTH MAINTENANCE LETTER (OUTPATIENT)
Age: 52
End: 2022-06-04

## 2022-09-14 ENCOUNTER — TELEPHONE (OUTPATIENT)
Dept: FAMILY MEDICINE | Facility: CLINIC | Age: 52
End: 2022-09-14

## 2022-10-10 ENCOUNTER — HEALTH MAINTENANCE LETTER (OUTPATIENT)
Age: 52
End: 2022-10-10

## 2023-05-27 ENCOUNTER — HEALTH MAINTENANCE LETTER (OUTPATIENT)
Age: 53
End: 2023-05-27

## 2023-06-10 ENCOUNTER — HEALTH MAINTENANCE LETTER (OUTPATIENT)
Age: 53
End: 2023-06-10

## 2024-07-30 ENCOUNTER — OFFICE VISIT (OUTPATIENT)
Dept: FAMILY MEDICINE | Facility: CLINIC | Age: 54
End: 2024-07-30
Payer: COMMERCIAL

## 2024-07-30 VITALS
DIASTOLIC BLOOD PRESSURE: 72 MMHG | HEIGHT: 66 IN | WEIGHT: 190 LBS | HEART RATE: 74 BPM | RESPIRATION RATE: 12 BRPM | BODY MASS INDEX: 30.53 KG/M2 | TEMPERATURE: 97.9 F | OXYGEN SATURATION: 100 % | SYSTOLIC BLOOD PRESSURE: 118 MMHG

## 2024-07-30 DIAGNOSIS — Z84.89 FAMILY HEALTH PROBLEM: Primary | ICD-10-CM

## 2024-07-30 DIAGNOSIS — E66.811 CLASS 1 OBESITY WITHOUT SERIOUS COMORBIDITY WITH BODY MASS INDEX (BMI) OF 30.0 TO 30.9 IN ADULT, UNSPECIFIED OBESITY TYPE: ICD-10-CM

## 2024-07-30 PROCEDURE — 99213 OFFICE O/P EST LOW 20 MIN: CPT

## 2024-07-30 PROCEDURE — G2211 COMPLEX E/M VISIT ADD ON: HCPCS

## 2024-07-30 SDOH — HEALTH STABILITY: PHYSICAL HEALTH: ON AVERAGE, HOW MANY DAYS PER WEEK DO YOU ENGAGE IN MODERATE TO STRENUOUS EXERCISE (LIKE A BRISK WALK)?: 3 DAYS

## 2024-07-30 ASSESSMENT — SOCIAL DETERMINANTS OF HEALTH (SDOH): HOW OFTEN DO YOU GET TOGETHER WITH FRIENDS OR RELATIVES?: THREE TIMES A WEEK

## 2024-07-30 NOTE — PROGRESS NOTES
"Preventive Care Visit  [facility]NP, Nurse Practitioner Primary Care  Jul 30, 2024      Assessment & Plan     Family health problem  Patients mother recently had two strokes resulting her to move in with patient. After this, she has discovered various family secrets that have caused the patient stress. We discussed this during out visit at length. Will reach out to social work if there are support groups for family members of a patients who have had a stroke as the patient may benefit from this. She may also look into therapy. Patient reports that outside of this, she is often a very happy individual.     Class 1 obesity without serious comorbidity with body mass index (BMI) of 30.0 to 30.9 in adult, unspecified obesity type  Discussed various weight management options. Patient is interested in trying Wegovy. Discussed potential side effects along with insurance coverage. Order placed and plan for patient to follow up in one month for evaluation.   - Semaglutide-Weight Management (WEGOVY) 0.25 MG/0.5ML pen; Inject 0.25 mg subcutaneously once a week    BMI  Estimated body mass index is 30.98 kg/m  as calculated from the following:    Height as of this encounter: 1.668 m (5' 5.67\").    Weight as of this encounter: 86.2 kg (190 lb).       Counseling  Appropriate preventive services were addressed with this patient via screening, questionnaire, or discussion as appropriate for fall prevention, nutrition, physical activity, Tobacco-use cessation, weight loss and cognition.  Checklist reviewing preventive services available has been given to the patient.  Reviewed patient's diet, addressing concerns and/or questions.   She is at risk for lack of exercise and has been provided with information to increase physical activity for the benefit of her well-being.   She is at risk for psychosocial distress and has been provided with information to reduce risk.       Subjective " "  Pat is a 54 year old, presenting for the following:  Physical    Preventative exam was not completed today as we discussed various other concerns regarding patient. Will complete physical at a later date.         7/30/2024    10:29 AM   Additional Questions   Roomed by Le JACOBSON CMA   Accompanied by Self        Via the Health Maintenance questionnaire, the patient has reported the following services have been completed -Mammogram: lizette lr [id number] was a problem with rule 9621023: <SUBSCRIPT>PrevQnrAnsProp+3^[id number] *LQLs() Subscript 1 is \"\", this information has not been sent to the abstraction team.  Health Care Directive  Patient does not have a Health Care Directive or Living Will: Discussed advance care planning with patient; information given to patient to review.    HPI    Today's PHQ-2 Score:       7/30/2024    10:16 AM   PHQ-2 ( 1999 Pfizer)   Q1: Little interest or pleasure in doing things 0   Q2: Feeling down, depressed or hopeless 0   PHQ-2 Score 0   Q1: Little interest or pleasure in doing things Not at all   Q2: Feeling down, depressed or hopeless Not at all   PHQ-2 Score 0        Objective    Exam  /72 (BP Location: Right arm, Patient Position: Sitting, Cuff Size: Adult Large)   Pulse 74   Temp 97.9  F (36.6  C) (Oral)   Resp 12   Ht 1.668 m (5' 5.67\")   Wt 86.2 kg (190 lb)   SpO2 100%   BMI 30.98 kg/m     Estimated body mass index is 30.98 kg/m  as calculated from the following:    Height as of this encounter: 1.668 m (5' 5.67\").    Weight as of this encounter: 86.2 kg (190 lb).    Physical Exam  GENERAL: alert and no distress  EYES: Eyes grossly normal to inspection, PERRL and conjunctivae and sclerae normal  HENT: ear canals and TM's normal, nose and mouth without ulcers or lesions  NECK: no adenopathy, no asymmetry, masses, or scars  RESP: lungs clear to auscultation - no rales, rhonchi or wheezes  CV: regular rate and rhythm, normal S1 S2, no S3 or S4, no murmur, " click or rub, no peripheral edema  ABDOMEN: soft, nontender, no hepatosplenomegaly, no masses and bowel sounds normal  MS: no gross musculoskeletal defects noted, no edema  SKIN: no suspicious lesions or rashes  NEURO: Normal strength and tone, mentation intact and speech normal  PSYCH: mentation appears normal, tearful      Signed Electronically by: HIPOLITO Brown CNP

## 2024-08-01 ENCOUNTER — TELEPHONE (OUTPATIENT)
Dept: FAMILY MEDICINE | Facility: CLINIC | Age: 54
End: 2024-08-01
Payer: COMMERCIAL

## 2024-08-01 NOTE — TELEPHONE ENCOUNTER
Prior Authorization Retail Medication Request    Medication/Dose: Semaglutide-Weight Management (WEGOVY) 0.25 MG/0.5ML pen  Diagnosis and ICD code (if different than what is on RX):    New/renewal/insurance change PA/secondary ins. PA:  Previously Tried and Failed:    Rationale:      Insurance   Primary:   Insurance ID:      Secondary (if applicable):  Insurance ID:      Pharmacy Information (if different than what is on RX)  Name:    Phone:    Fax:

## 2024-08-03 ENCOUNTER — HEALTH MAINTENANCE LETTER (OUTPATIENT)
Age: 54
End: 2024-08-03

## 2024-08-05 NOTE — TELEPHONE ENCOUNTER
Retail Pharmacy Prior Authorization Team   Phone: [phone]    PA Initiation    Medication: WEGOVY 0.25 MG/0.5ML SC SOAJ  Insurance Company: [employer] ([employer]) - Phone [phone] Fax [phone]  Pharmacy Filling the Rx: [id number] - [place], [place] - [address] [place] [address]  Filling Pharmacy Phone: [phone]  Filling Pharmacy Fax:    Start Date: 8/5/2024    Started PA on CMM and a response of This medication may be excluded from the patient's benefit. For more information, please reach out to [employer] directly.  Per insurance, this medication is a plan exclusion and there is not an option to complete a PA.

## 2024-09-03 ENCOUNTER — OFFICE VISIT (OUTPATIENT)
Dept: FAMILY MEDICINE | Facility: CLINIC | Age: 54
End: 2024-09-03
Payer: COMMERCIAL

## 2024-09-03 VITALS
WEIGHT: 194.8 LBS | DIASTOLIC BLOOD PRESSURE: 81 MMHG | OXYGEN SATURATION: 98 % | HEIGHT: 66 IN | TEMPERATURE: 97.9 F | BODY MASS INDEX: 31.31 KG/M2 | HEART RATE: 65 BPM | SYSTOLIC BLOOD PRESSURE: 118 MMHG | RESPIRATION RATE: 12 BRPM

## 2024-09-03 DIAGNOSIS — Z13.6 ENCOUNTER FOR LIPID SCREENING FOR CARDIOVASCULAR DISEASE: ICD-10-CM

## 2024-09-03 DIAGNOSIS — Z12.4 CERVICAL CANCER SCREENING: ICD-10-CM

## 2024-09-03 DIAGNOSIS — Z00.00 VISIT FOR PREVENTIVE HEALTH EXAMINATION: Primary | ICD-10-CM

## 2024-09-03 DIAGNOSIS — E66.811 CLASS 1 OBESITY WITHOUT SERIOUS COMORBIDITY WITH BODY MASS INDEX (BMI) OF 30.0 TO 30.9 IN ADULT, UNSPECIFIED OBESITY TYPE: ICD-10-CM

## 2024-09-03 DIAGNOSIS — Z13.1 SCREENING FOR DIABETES MELLITUS: ICD-10-CM

## 2024-09-03 DIAGNOSIS — Z13.220 ENCOUNTER FOR LIPID SCREENING FOR CARDIOVASCULAR DISEASE: ICD-10-CM

## 2024-09-03 DIAGNOSIS — Z12.31 VISIT FOR SCREENING MAMMOGRAM: ICD-10-CM

## 2024-09-03 LAB
ANION GAP SERPL CALCULATED.3IONS-SCNC: 10 MMOL/L (ref 7–15)
BUN SERPL-MCNC: 15.3 MG/DL (ref 6–20)
CALCIUM SERPL-MCNC: 9.3 MG/DL (ref 8.8–10.4)
CHLORIDE SERPL-SCNC: 107 MMOL/L (ref 98–107)
CHOLEST SERPL-MCNC: 197 MG/DL
CREAT SERPL-MCNC: 0.81 MG/DL (ref 0.51–0.95)
EGFRCR SERPLBLD CKD-EPI 2021: 86 ML/MIN/1.73M2
FASTING STATUS PATIENT QL REPORTED: NO
FASTING STATUS PATIENT QL REPORTED: NO
GLUCOSE SERPL-MCNC: 91 MG/DL (ref 70–99)
HBA1C MFR BLD: 5.7 % (ref 0–5.6)
HCO3 SERPL-SCNC: 23 MMOL/L (ref 22–29)
HDLC SERPL-MCNC: 45 MG/DL
LDLC SERPL CALC-MCNC: 134 MG/DL
NONHDLC SERPL-MCNC: 152 MG/DL
POTASSIUM SERPL-SCNC: 4.3 MMOL/L (ref 3.4–5.3)
SODIUM SERPL-SCNC: 140 MMOL/L (ref 135–145)
TRIGL SERPL-MCNC: 88 MG/DL
TSH SERPL DL<=0.005 MIU/L-ACNC: 1.73 UIU/ML (ref 0.3–4.2)

## 2024-09-03 PROCEDURE — 84443 ASSAY THYROID STIM HORMONE: CPT

## 2024-09-03 PROCEDURE — 80061 LIPID PANEL: CPT

## 2024-09-03 PROCEDURE — 99396 PREV VISIT EST AGE 40-64: CPT | Mod: 25

## 2024-09-03 PROCEDURE — 90471 IMMUNIZATION ADMIN: CPT

## 2024-09-03 PROCEDURE — 87624 HPV HI-RISK TYP POOLED RSLT: CPT

## 2024-09-03 PROCEDURE — G0145 SCR C/V CYTO,THINLAYER,RESCR: HCPCS

## 2024-09-03 PROCEDURE — 83036 HEMOGLOBIN GLYCOSYLATED A1C: CPT

## 2024-09-03 PROCEDURE — 80048 BASIC METABOLIC PNL TOTAL CA: CPT

## 2024-09-03 PROCEDURE — 36415 COLL VENOUS BLD VENIPUNCTURE: CPT

## 2024-09-03 PROCEDURE — 90715 TDAP VACCINE 7 YRS/> IM: CPT

## 2024-09-03 RX ORDER — TOPIRAMATE 50 MG/1
50 TABLET, FILM COATED ORAL DAILY
Qty: 30 TABLET | Refills: 0 | Status: SHIPPED | OUTPATIENT
Start: 2024-09-03 | End: 2024-09-17

## 2024-09-03 NOTE — PROGRESS NOTES
"Preventive Care Visit  [facility]  HIPOLITO Brown CNP, Nurse Practitioner Primary Care  Sep 3, 2024      Assessment & Plan     Visit for preventive health examination  - TDAP 10-64Y (ADACEL,BOOSTRIX)    Visit for screening mammogram  - MA Screening Bilateral w/ Augie; Future    Cervical cancer screening  - HPV and Gynecologic Cytology Panel - Recommended Age 30 - 65 Years    Screening for diabetes mellitus  - Hemoglobin A1c; Future  - Hemoglobin A1c    Encounter for lipid screening for cardiovascular disease  - Lipid panel reflex to direct LDL Non-fasting; Future  - Lipid panel reflex to direct LDL Non-fasting    Class 1 obesity without serious comorbidity with body mass index (BMI) of 30.0 to 30.9 in adult, unspecified obesity type  Labs ordered as indicated below. Wegovy was denied. Discussed various options for weight management. Will try topamax, plan for patient to follow up in a couple of weeks and if no side effect, will increase dosage. Discussed diet and exercise recommendations.   - topiramate (TOPAMAX) 50 MG tablet; Take 1 tablet (50 mg) by mouth daily.  - Basic metabolic panel  (Ca, Cl, CO2, Creat, Gluc, K, Na, BUN); Future  - TSH with free T4 reflex; Future  - Basic metabolic panel  (Ca, Cl, CO2, Creat, Gluc, K, Na, BUN)  - TSH with free T4 reflex    BMI  Estimated body mass index is 31.92 kg/m  as calculated from the following:    Height as of this encounter: 1.664 m (5' 5.5\").    Weight as of this encounter: 88.4 kg (194 lb 12.8 oz).       Counseling  Appropriate preventive services were addressed with this patient via screening, questionnaire, or discussion as appropriate for fall prevention, nutrition, physical activity, Tobacco-use cessation, social engagement, weight loss and cognition.  Checklist reviewing preventive services available has been given to the patient.  Reviewed patient's diet, addressing concerns and/or questions.           Subjective   Pat is a 54 " year old, presenting for the following:  Physical        9/3/2024     8:48 AM   Additional Questions   Roomed by dane pike        Health Care Directive  Patient does not have a Health Care Directive or Living Will: Discussed advance care planning with patient; information given to patient to review.    History of Present Illness       Reason for visit:  Physical    She eats 4 or more servings of fruits and vegetables daily.She consumes 0 sweetened beverage(s) daily.She exercises with enough effort to increase her heart rate 30 to 60 minutes per day.  She exercises with enough effort to increase her heart rate 5 days per week.   She is taking medications regularly.        9/3/2024   General Health   How would you rate your overall physical health? Good   Feel stress (tense, anxious, or unable to sleep) To some extent          9/3/2024   Nutrition   Three or more servings of calcium each day? Yes   Diet: Regular (no restrictions)   How many servings of fruit and vegetables per day? 4 or more   How many sweetened beverages each day? 0-1          9/3/2024   Exercise   Days per week of moderate/strenous exercise 5 days   Average minutes spent exercising at this level 60 min    Goes to [employer]      9/3/2024   Social Factors   Frequency of gathering with friends or relatives More than three times a week   Worry food won't last until get money to buy more Patient declined   Food not last or not have enough money for food? Patient declined   Do you have housing? (Housing is defined as stable permanent housing and does not include staying ouside in a car, in a tent, in an abandoned building, in an overnight shelter, or couch-surfing.) Patient declined   Are you worried about losing your housing? Patient declined   Lack of transportation? Patient declined   Unable to get utilities (heat,electricity)? Patient declined            9/3/2024   Fall Risk   Fallen 2 or more times in the past year? No   Trouble with walking or  "balance? No             9/3/2024   Dental   Dentist two times every year? Yes          7/30/2024   TB Screening   Were you born outside of the [place]? No      Today's PHQ-2 Score:       9/3/2024     8:57 AM   PHQ-2 ( 1999 [employer])   Q1: Little interest or pleasure in doing things 0   Q2: Feeling down, depressed or hopeless 0   PHQ-2 Score 0         9/3/2024   Substance Use   Alcohol more than 3/day or more than 7/wk Not Applicable   Do you use any other substances recreationally? No      Social History     Tobacco Use    Smoking status: Never    Smokeless tobacco: Never   Vaping Use    Vaping status: Never Used   Substance Use Topics    Alcohol use: No    Drug use: No      Mammogram Screening - Mammogram every 1-2 years updated in Health Maintenance based on mutual decision making        9/3/2024   STI Screening   New sexual partner(s) since last STI/HIV test? No        History of abnormal Pap smear: No - age 30-64 HPV with reflex Pap every 5 years recommended        Latest Ref Rng & Units 4/9/2018    12:08 PM 4/9/2018    11:30 AM 4/9/2014    12:00 AM   PAP / HPV   PAP (Historical)  NIL   NIL    HPV 16 DNA NEG^Negative  Negative     HPV 18 DNA NEG^Negative  Negative     Other HR HPV NEG^Negative  Negative       ASCVD Risk   The ASCVD Risk score (Shakira KENT, et al., 2019) failed to calculate for the following reasons:    Cannot find a previous HDL lab    Cannot find a previous total cholesterol lab       Reviewed and updated as needed this visit by Provider   Tobacco  Allergies  Meds  Problems  Med Hx  Surg Hx  Fam Hx                     Objective    Exam  /81 (BP Location: Right arm, Patient Position: Sitting, Cuff Size: Adult Regular)   Pulse 65   Temp 97.9  F (36.6  C) (Temporal)   Resp 12   Ht 1.664 m (5' 5.5\")   Wt 88.4 kg (194 lb 12.8 oz)   SpO2 98%   BMI 31.92 kg/m     Estimated body mass index is 31.92 kg/m  as calculated from the following:    Height as of this encounter: 1.664 m (5' " "5.5\").    Weight as of this encounter: 88.4 kg (194 lb 12.8 oz).    Physical Exam  GENERAL: alert and no distress  EYES: Eyes grossly normal to inspection, PERRL and conjunctivae and sclerae normal  HENT: ear canals and TM's normal, nose and mouth without ulcers or lesions  NECK: no adenopathy, no asymmetry, masses, or scars  RESP: lungs clear to auscultation - no rales, rhonchi or wheezes  CV: regular rate and rhythm, normal S1 S2, no S3 or S4, no murmur, click or rub, no peripheral edema  ABDOMEN: soft, nontender, no hepatosplenomegaly, no masses and bowel sounds normal   (female): normal female external genitalia, normal urethral meatus, normal vaginal mucosa  MS: no gross musculoskeletal defects noted, no edema  SKIN: no suspicious lesions or rashes  NEURO: Normal strength and tone, mentation intact and speech normal  PSYCH: mentation appears normal, affect normal/bright        Signed Electronically by: HIPOLITO Brown CNP    "

## 2024-09-03 NOTE — RESULT ENCOUNTER NOTE
Douglas Stewart,     It was nice seeing you again.      Your LDL (bad cholesterol) and HDL (good cholesterol) are slightly abnormal. Making the diet and exercise changes like we discussed in clinic today and help improve this. We will recheck them again in one year.     Your hgba1c (HgbA1C is a blood test that looks at your average blood sugar over a three month time span) is slightly elevated indicating that you are in the prediabetic range. Do not be alarmed, this will also improve with diet and exercise. We will recheck this again in one year.     All of your other labs are normal.      Feel free to contact me via Invisible or call the clinic at [phone].     Sincerely,  Elizabeth Arteaga, CAMRYN, FNP-C    The 10-year ASCVD risk score (Shakira KENT, et al., 2019) is: 1.9%

## 2024-09-04 LAB
HPV HR 12 DNA CVX QL NAA+PROBE: NEGATIVE
HPV16 DNA CVX QL NAA+PROBE: NEGATIVE
HPV18 DNA CVX QL NAA+PROBE: NEGATIVE
HUMAN PAPILLOMA VIRUS FINAL DIAGNOSIS: NORMAL

## 2024-09-06 LAB
BKR AP ASSOCIATED HPV REPORT: NORMAL
BKR LAB AP GYN ADEQUACY: NORMAL
BKR LAB AP GYN INTERPRETATION: NORMAL
BKR LAB AP PREVIOUS ABNORMAL: NORMAL
PATH REPORT.COMMENTS IMP SPEC: NORMAL
PATH REPORT.COMMENTS IMP SPEC: NORMAL
PATH REPORT.RELEVANT HX SPEC: NORMAL

## 2024-09-17 ENCOUNTER — VIRTUAL VISIT (OUTPATIENT)
Dept: FAMILY MEDICINE | Facility: CLINIC | Age: 54
End: 2024-09-17
Payer: COMMERCIAL

## 2024-09-17 DIAGNOSIS — E66.811 CLASS 1 OBESITY WITHOUT SERIOUS COMORBIDITY WITH BODY MASS INDEX (BMI) OF 30.0 TO 30.9 IN ADULT, UNSPECIFIED OBESITY TYPE: ICD-10-CM

## 2024-09-17 DIAGNOSIS — R73.03 PREDIABETES: Primary | ICD-10-CM

## 2024-09-17 PROCEDURE — 99442 PR PHYSICIAN TELEPHONE EVALUATION 11-20 MIN: CPT | Mod: 93

## 2024-09-17 RX ORDER — METFORMIN HCL 500 MG
TABLET, EXTENDED RELEASE 24 HR ORAL
Qty: 74 TABLET | Refills: 0 | Status: SHIPPED | OUTPATIENT
Start: 2024-09-17 | End: 2024-10-31

## 2024-09-17 NOTE — PROGRESS NOTES
"Pat is a 54 year old who is being evaluated via a billable telephone visit.    What phone number would you like to be contacted at? [phone]   How would you like to obtain your AVS? [place]  Originating Location (pt. Location): Home    Distant Location (provider location):  On-site    Assessment & Plan     Prediabetes  Patient reported side effects to topiramate. Hgba1c 5.7. Will try metformin for weight loss and plan for patient to follow up in a month for evaluation.   - metFORMIN (GLUCOPHAGE XR) 500 MG 24 hr tablet; Take 1 tablet (500 mg) by mouth daily (with dinner) for 14 days, THEN 1 tablet (500 mg) 2 times daily (with meals).    Class 1 obesity without serious comorbidity with body mass index (BMI) of 30.0 to 30.9 in adult, unspecified obesity type  See above  - metFORMIN (GLUCOPHAGE XR) 500 MG 24 hr tablet; Take 1 tablet (500 mg) by mouth daily (with dinner) for 14 days, THEN 1 tablet (500 mg) 2 times daily (with meals).    BMI  Estimated body mass index is 31.92 kg/m  as calculated from the following:    Height as of 9/3/24: 1.664 m (5' 5.5\").    Weight as of 9/3/24: 88.4 kg (194 lb 12.8 oz).         Subjective   Pat is a 54 year old, presenting for the following health issues:  Recheck Medication (Topamax- side effects)      9/17/2024     5:17 PM   Additional Questions   Roomed by dane pike     History of Present Illness       Reason for visit:  Topamax- weight loss    She eats 2-3 servings of fruits and vegetables daily.She consumes 0 sweetened beverage(s) daily.She exercises with enough effort to increase her heart rate 30 to 60 minutes per day.  She exercises with enough effort to increase her heart rate 3 or less days per week.   She is taking medications regularly.     Experiencing side effects related to mood and crying.                Objective           Vitals:  No vitals were obtained today due to virtual visit.    Physical Exam   General: Alert and no distress //Respiratory: No audible " wheeze, cough, or shortness of breath // Psychiatric:  Appropriate affect, tone, and pace of words            Phone call duration: 11 minutes  Signed Electronically by: HIPOLITO Brown CNP

## 2024-09-17 NOTE — PROGRESS NOTES
Pat is a 54 year old who is being evaluated via a billable telephone visit.    How would you like to obtain your AVS? MyChart  If the video visit is dropped, the invitation should be resent by: Text to cell phone: [phone]  Will anyone else be joining your video visit? No  Originating Location (pt. Location): Home  {PROVIDER LOCATION On-site should be selected for visits conducted from your clinic location or adjoining [facility] hospital, academic office, or other nearby [facility] building. Off-site should be selected for all other provider locations, including home:[address]}  Distant Location (provider location):  {virtual location provider:619899}    {PROVIDER CHARTING PREFERENCE:814571}    Subjective   Pat is a 54 year old, presenting for the following health issues:  Recheck Medication (Topamax- side effects)      9/17/2024     5:17 PM   Additional Questions   Roomed by dane pike     History of Present Illness       Reason for visit:  Topamax- weight loss    She eats 2-3 servings of fruits and vegetables daily.She consumes 0 sweetened beverage(s) daily.She exercises with enough effort to increase her heart rate 30 to 60 minutes per day.  She exercises with enough effort to increase her heart rate 3 or less days per week.   She is taking medications regularly.     Pt reports that the topamax has side effects that she has been dealing with lately.  Mood and crying. She would like to discuss an alternative.     {SUPERLIST (Optional):791477}  {additonal problems for provider to add (Optional):032949}    {ROS Picklists (Optional):882421}      Objective           Vitals:  No vitals were obtained today due to virtual visit.    Physical Exam   General: Alert and no distress //Respiratory: No audible wheeze, cough, or shortness of breath // Psychiatric:  Appropriate affect, tone, and pace of words      {Diagnostic Test Results (Optional):103231}      Phone call duration: *** minutes  Signed Electronically by: Elizabeth Arteaga,  APRN CNP  {Email feedback regarding this note to [email or web address]   :404756}

## 2024-11-12 ENCOUNTER — TELEPHONE (OUTPATIENT)
Dept: FAMILY MEDICINE | Facility: CLINIC | Age: 54
End: 2024-11-12
Payer: COMMERCIAL

## 2024-11-12 NOTE — TELEPHONE ENCOUNTER
Patient Quality Outreach    Patient is due for the following:   Breast Cancer Screening - Mammogram    Action(s) Taken:   Schedule mammogram    Type of outreach:    Sent [employer] message.    Questions for provider review:    None           Leopoldo Persaud MA

## 2025-01-13 ENCOUNTER — TELEPHONE (OUTPATIENT)
Dept: FAMILY MEDICINE | Facility: CLINIC | Age: 55
End: 2025-01-13
Payer: COMMERCIAL

## 2025-01-13 ENCOUNTER — MYC MEDICAL ADVICE (OUTPATIENT)
Dept: FAMILY MEDICINE | Facility: CLINIC | Age: 55
End: 2025-01-13
Payer: COMMERCIAL

## 2025-01-13 DIAGNOSIS — R73.03 PREDIABETES: Primary | ICD-10-CM

## 2025-01-13 DIAGNOSIS — E66.811 CLASS 1 OBESITY WITHOUT SERIOUS COMORBIDITY WITH BODY MASS INDEX (BMI) OF 30.0 TO 30.9 IN ADULT, UNSPECIFIED OBESITY TYPE: ICD-10-CM

## 2025-01-13 RX ORDER — TIRZEPATIDE 2.5 MG/.5ML
2.5 INJECTION, SOLUTION SUBCUTANEOUS
Qty: 2 ML | Refills: 0 | Status: SHIPPED | OUTPATIENT
Start: 2025-01-13

## 2025-01-13 NOTE — TELEPHONE ENCOUNTER
Retail Pharmacy Prior Authorization Team  Phone: [phone]    PRIOR AUTHORIZATION DENIED    Medication: MOUNJARO 2.5 MG/0.5ML SC SOAJ  Insurance Company: [employer]Specialty [employer]'s - Phone [phone] Fax [phone]  Denial Date: 1/13/2025  Denial Reason(s):         Appeal Information:         Patient Notified: NO- Unfortunately, we cannot call the patient with denials because we do not know what next steps the MD will take nor can we give medical advice, please notify the patient of what they are to expect for the continuation of their therapy from the provider.

## 2025-01-27 ENCOUNTER — TELEPHONE (OUTPATIENT)
Dept: FAMILY MEDICINE | Facility: CLINIC | Age: 55
End: 2025-01-27

## 2025-01-27 NOTE — TELEPHONE ENCOUNTER
Patient Quality Outreach    Patient is due for the following:   Breast Cancer Screening - Mammogram    Action(s) Taken:   Needs to schedule mammogram    Type of outreach:    Sent [employer] message.    Questions for provider review:    None           Leopoldo Persaud MA

## 2025-02-11 ENCOUNTER — VIRTUAL VISIT (OUTPATIENT)
Dept: FAMILY MEDICINE | Facility: CLINIC | Age: 55
End: 2025-02-11
Payer: COMMERCIAL

## 2025-02-11 DIAGNOSIS — E66.811 CLASS 1 OBESITY WITHOUT SERIOUS COMORBIDITY WITH BODY MASS INDEX (BMI) OF 30.0 TO 30.9 IN ADULT, UNSPECIFIED OBESITY TYPE: Primary | ICD-10-CM

## 2025-02-11 PROCEDURE — 98005 SYNCH AUDIO-VIDEO EST LOW 20: CPT

## 2025-02-11 RX ORDER — BUPROPION HYDROCHLORIDE 150 MG/1
150 TABLET ORAL EVERY MORNING
Qty: 30 TABLET | Refills: 1 | Status: SHIPPED | OUTPATIENT
Start: 2025-02-11

## 2025-02-11 RX ORDER — NALTREXONE HYDROCHLORIDE 50 MG/1
25 TABLET, FILM COATED ORAL DAILY
Qty: 30 TABLET | Refills: 1 | Status: SHIPPED | OUTPATIENT
Start: 2025-02-11

## 2025-02-11 RX ORDER — NALTREXONE HYDROCHLORIDE 50 MG/1
50 TABLET, FILM COATED ORAL DAILY
Qty: 30 TABLET | Refills: 1 | Status: CANCELLED | OUTPATIENT
Start: 2025-02-11

## 2025-02-11 NOTE — PROGRESS NOTES
"Pat is a 54 year old who is being evaluated via a billable video visit.    How would you like to obtain your AVS? MyChart  If the video visit is dropped, the invitation should be resent by: Text to cell phone: [phone]  Will anyone else be joining your video visit? No      Assessment & Plan     Class 1 obesity without serious comorbidity with body mass index (BMI) of 30.0 to 30.9 in adult, unspecified obesity type  Did not like side effects of metformin and toperimate. Mounjaro was not covered by insurance. Will try Bupropion and naltrexone. Plan for patient to message in two weeks and if no side effects, will increase dose.   - buPROPion (WELLBUTRIN XL) 150 MG 24 hr tablet; Take 1 tablet (150 mg) by mouth every morning.  - naltrexone (DEPADE/REVIA) 50 MG tablet; Take 0.5 tablets (25 mg) by mouth daily.      BMI  Estimated body mass index is 31.92 kg/m  as calculated from the following:    Height as of 9/3/24: 1.664 m (5' 5.5\").    Weight as of 9/3/24: 88.4 kg (194 lb 12.8 oz).         Subjective   Pat is a 54 year old, presenting for the following health issues:  Weight Loss and Recheck Medication      2/11/2025     1:17 PM   Additional Questions   Roomed by dane pike     History of Present Illness       Reason for visit:  Weight loss    She eats 2-3 servings of fruits and vegetables daily.She consumes 0 sweetened beverage(s) daily.She exercises with enough effort to increase her heart rate 30 to 60 minutes per day.  She exercises with enough effort to increase her heart rate 4 days per week.   She is taking medications regularly.     Current 185lb          Objective           Vitals:  No vitals were obtained today due to virtual visit.    Physical Exam   GENERAL: alert and no distress  EYES: Eyes grossly normal to inspection.  No discharge or erythema, or obvious scleral/conjunctival abnormalities.  RESP: No audible wheeze, cough, or visible cyanosis.    SKIN: Visible skin clear. No significant rash, abnormal " pigmentation or lesions.  NEURO: Cranial nerves grossly intact.  Mentation and speech appropriate for age.  PSYCH: Appropriate affect, tone, and pace of words          Video-Visit Details    Type of service:  Video Visit   Originating Location (pt. Location): Home    Distant Location (provider location):  On-site  Platform used for Video Visit: Yajaira  Signed Electronically by: HIPOLITO Brown CNP

## 2025-07-03 ENCOUNTER — MYC MEDICAL ADVICE (OUTPATIENT)
Dept: FAMILY MEDICINE | Facility: CLINIC | Age: 55
End: 2025-07-03
Payer: COMMERCIAL

## 2025-07-07 ENCOUNTER — VIRTUAL VISIT (OUTPATIENT)
Dept: FAMILY MEDICINE | Facility: CLINIC | Age: 55
End: 2025-07-07
Payer: COMMERCIAL

## 2025-07-07 DIAGNOSIS — R73.03 PREDIABETES: ICD-10-CM

## 2025-07-07 DIAGNOSIS — E66.811 CLASS 1 OBESITY WITHOUT SERIOUS COMORBIDITY WITH BODY MASS INDEX (BMI) OF 30.0 TO 30.9 IN ADULT, UNSPECIFIED OBESITY TYPE: Primary | ICD-10-CM

## 2025-07-07 PROCEDURE — 98013 SYNCH AUDIO-ONLY EST LOW 20: CPT

## 2025-07-07 NOTE — PATIENT INSTRUCTIONS
Weight management:    Nutrition:  1.2 to 1.5 g/kg of protein daily  Space protein every 3-4 hours  Supplement with whey protein enriched with leucine and vitamin D  Reduce/minimize processed foods  Adequate fruit and veggies   Consider high quality vitamin   Use food trackers like Lose It, myfitness pal or My Net Diary to help see food patterns   Practice nutrition mindfulness. Avoid any distractions while eating     Physical activity:  Resistance training and aerobic activity to help prevent muscle loss   > 2 days of strength training a week  Focus on enjoyable activity    Prevent Nausea:  Eat small frequent meals (every 3-4 hours)  Eat breakfast  Avoid high fat//high sugar foods  Acupressure bands  Jeane/peppermint tea    Other:  Focus on getting anywhere from 6-8 hours of sleep

## 2025-07-07 NOTE — PROGRESS NOTES
"Pat is a 55 year old who is being evaluated via a billable video visit.    What phone number would you like to be contacted at? [phone]  How would you like to obtain your AVS? [place]on      Assessment & Plan     Class 1 obesity without serious comorbidity with body mass index (BMI) of 30.0 to 30.9 in adult, unspecified obesity type  Has been on a number of medication for weight loss without improvement. Has been on Metformin, Topiramate, wellbutrin and Nalrexone without improvement. Will place order for Wegovy. Discussed nutrition and exercise recommendations along with potential side effects. Patient reports understanding. If Wegovy is denied, will complete letter since she has been on a number of medication for weight management without improvement.   - semaglutide-weight management (WEGOVY) 0.25 MG/0.5ML pen; Inject 0.5 mLs (0.25 mg) subcutaneously once a week.    Prediabetes  See above.   - semaglutide-weight management (WEGOVY) 0.25 MG/0.5ML pen; Inject 0.5 mLs (0.25 mg) subcutaneously once a week.    The longitudinal plan of care for the diagnosis(es)/condition(s) as documented were addressed during this visit. Due to the added complexity in care, I will continue to support Pat in the subsequent management and with ongoing continuity of care.    BMI  Estimated body mass index is 31.92 kg/m  as calculated from the following:    Height as of 9/3/24: 1.664 m (5' 5.5\").    Weight as of 9/3/24: 88.4 kg (194 lb 12.8 oz).             Subjective   Pat is a 55 year old, presenting for the following health issues:  Medication Request        2/11/2025     1:17 PM   Additional Questions   Roomed by dane pike     History of Present Illness       Reason for visit:  Would like weight loss medication  Symptom onset:  1-2 weeks ago   She is taking medications regularly.              Objective    Vitals - Patient Reported  Weight (Patient Reported): 87.5 kg (193 lb)        Physical Exam   PSYCH: Appropriate tone, and pace " of words    Telephone visit was completed as video was not working for provider. A total of 10 minutes were spend on the telephone visit today with patient.       Video-Visit Details    Type of service:  telephone Visit   Originating Location (pt. Location): Home    Distant Location (provider location):  On-site  Platform used for Video Visit: Telephone  Signed Electronically by: HIPOLITO Brown CNP

## 2025-08-04 ENCOUNTER — PATIENT OUTREACH (OUTPATIENT)
Dept: CARE COORDINATION | Facility: CLINIC | Age: 55
End: 2025-08-04
Payer: COMMERCIAL

## 2025-08-16 ENCOUNTER — HEALTH MAINTENANCE LETTER (OUTPATIENT)
Age: 55
End: 2025-08-16

## 2025-08-18 ENCOUNTER — PATIENT OUTREACH (OUTPATIENT)
Dept: CARE COORDINATION | Facility: CLINIC | Age: 55
End: 2025-08-18
Payer: COMMERCIAL

## (undated) DEVICE — PREP CHLORAPREP 26ML TINTED ORANGE  260815

## (undated) DEVICE — SOL WATER IRRIG 1000ML BOTTLE 07139-09

## (undated) RX ORDER — FENTANYL CITRATE 50 UG/ML
INJECTION, SOLUTION INTRAMUSCULAR; INTRAVENOUS
Status: DISPENSED
Start: 2021-03-17

## (undated) RX ORDER — ONDANSETRON 2 MG/ML
INJECTION INTRAMUSCULAR; INTRAVENOUS
Status: DISPENSED
Start: 2021-03-17